# Patient Record
Sex: FEMALE | Employment: OTHER | URBAN - METROPOLITAN AREA
[De-identification: names, ages, dates, MRNs, and addresses within clinical notes are randomized per-mention and may not be internally consistent; named-entity substitution may affect disease eponyms.]

---

## 2021-09-16 NOTE — PROGRESS NOTES
Tree Magana is a 56 y.o. female who presents today to establish care in the area. Chief Complaint   Patient presents with    Hypertension       Assessment/ Plan:     Diagnoses and all orders for this visit:    1. Hypertension, unspecified type  -     CBC WITH AUTOMATED DIFF; Future  -     METABOLIC PANEL, COMPREHENSIVE; Future  -     LIPID PANEL; Future  -     COLLECTION VENOUS BLOOD,VENIPUNCTURE     -Low-sodium diet   -Exercise   -RTO or go to ER for any CP, SOB, dizziness, or swelling. -F/U:6 months      2. Situational Stress  Continue to exercise   Limit alcohol intake  Limit caffeine intake  Will suggest starting alternate medication instead of xanax if needed  F/U: 6 months    Health Maintenance  Colonoscopy: Last colonoscopy 9/27/2019, repeat in 5 years  Mammogram: Last mammogram 1/7/2021, Repeat in 1 year   Shingrix: Recd  Second shingles vaccine given 5/14/21      Orders Placed This Encounter    ALPRAZolam (XANAX) 0.25 mg tablet    cyanocobalamin (VITAMIN B12) 100 mcg tablet     Sig: Vitamin B12    calcium carbonate (CALCIUM 500 PO)     Sig: Take 500 mg by mouth. Subjective:     Tree Magana is a 56 y.o. female presents with history of hypertension. She was previously seeing an APRN in [place] and states they were 'watching' her blood pressure due to some high readings. States she has systolic of 893 on a few different occassions. She does not take any medications, she walks daily. Denies CP, SOB, swelling in extremities. Stress and anxiety  She recently moved to [place], [place] from [place]. Retired from the state of [place]. Reports her  passed away in 2019 and she was prescribed xanax 0.5mg as needed, she takes xanax about twice a month. Discussed starting another medication to help her sleep if needed, she would like to wait at this time. Health maintenance:  She has requested her medical history be sent to the practice.  States she would like to wait on referrals until her records are reviewed. Colonoscopy: Done 2019, normal    There is no problem list on file for this patient. Past Medical History:   Diagnosis Date    Anxiety     Hypertension          Current Outpatient Medications:     ALPRAZolam (XANAX) 0.25 mg tablet, , Disp: , Rfl:     cyanocobalamin (VITAMIN B12) 100 mcg tablet, Vitamin B12, Disp: , Rfl:     calcium carbonate (CALCIUM 500 PO), Take 500 mg by mouth., Disp: , Rfl:     Allergies   Allergen Reactions    Sulfa (Sulfonamide Antibiotics) Rash       Past Surgical History:   Procedure Laterality Date    HX CYST REMOVAL  1972       Social History     Tobacco Use   Smoking Status Never Smoker   Smokeless Tobacco Never Used       Social History     Socioeconomic History    Marital status:      Spouse name: Not on file    Number of children: Not on file    Years of education: Not on file    Highest education level: Not on file   Tobacco Use    Smoking status: Never Smoker    Smokeless tobacco: Never Used   Substance and Sexual Activity    Alcohol use: Yes     Alcohol/week: 3.0 standard drinks     Types: 3 Cans of beer per week    Drug use: Never    Sexual activity: Not Currently     Social Determinants of Health     Financial Resource Strain:     Difficulty of Paying Living Expenses:    Food Insecurity:     Worried About Running Out of Food in the Last Year:     Ran Out of Food in the Last Year:    Transportation Needs:     Lack of Transportation (Medical):      Lack of Transportation (Non-Medical):    Physical Activity:     Days of Exercise per Week:     Minutes of Exercise per Session:    Stress:     Feeling of Stress :    Social Connections:     Frequency of Communication with Friends and Family:     Frequency of Social Gatherings with Friends and Family:     Attends Synagogue Services:     Active Member of Clubs or Organizations:     Attends Club or Organization Meetings:     Marital Status:        Family History   Problem Relation Age of Onset    Diabetes Mother     Stroke Mother     Cancer Father     Heart Disease Father     Hypertension Father     Diabetes Sister     Hypertension Sister     Heart Disease Brother     Hypertension Brother        ROS:  Review of Systems   Constitutional: Negative for chills, fever and weight loss. HENT: Negative for ear discharge, ear pain and sinus pain. Eyes: Negative for blurred vision, double vision, pain and discharge. Respiratory: Negative for cough, hemoptysis, shortness of breath and wheezing. Cardiovascular: Negative for chest pain and palpitations. Gastrointestinal: Negative for abdominal pain, diarrhea, nausea and vomiting. Genitourinary: Negative for dysuria, flank pain, frequency and hematuria. Musculoskeletal: Negative for back pain, falls, joint pain and myalgias. Skin: Negative for rash. Neurological: Negative for dizziness, speech change, loss of consciousness, weakness and headaches. Endo/Heme/Allergies: Does not bruise/bleed easily. Psychiatric/Behavioral: Negative for depression, hallucinations, substance abuse and suicidal ideas. The patient is nervous/anxious. History of stress and anxiety after  passed away suddenly in 2019         Objective:     Visit Vitals  /66 (BP 1 Location: Left upper arm)   Pulse 72   Temp 99.3 °F (37.4 °C) (Temporal)   Resp 18   Ht 5' 6\" (1.676 m)   Wt 126 lb 4 oz (57.3 kg)   SpO2 99%   BMI 20.38 kg/m²     Body mass index is 20.38 kg/m². Physical Exam  Constitutional:       General: She is awake. Appearance: Normal appearance. She is well-developed, well-groomed and normal weight. She is not ill-appearing. HENT:      Head: Normocephalic and atraumatic. Right Ear: Tympanic membrane and external ear normal.      Left Ear: Tympanic membrane and external ear normal.      Nose: Nose normal.      Mouth/Throat:      Pharynx: Oropharynx is clear.    Eyes:      Extraocular Movements: Extraocular movements intact. Conjunctiva/sclera: Conjunctivae normal.      Pupils: Pupils are equal, round, and reactive to light. Cardiovascular:      Rate and Rhythm: Normal rate and regular rhythm. Pulses: Normal pulses. Heart sounds: Normal heart sounds. No murmur heard. No friction rub. Pulmonary:      Effort: Pulmonary effort is normal.      Breath sounds: Normal breath sounds. No wheezing. Abdominal:      General: Bowel sounds are normal.      Palpations: Abdomen is soft. Tenderness: There is no abdominal tenderness. There is no guarding. Musculoskeletal:         General: Normal range of motion. Cervical back: Normal range of motion. Skin:     General: Skin is warm and dry. Capillary Refill: Capillary refill takes less than 2 seconds. Neurological:      General: No focal deficit present. Mental Status: She is alert and oriented to person, place, and time. Mental status is at baseline. Psychiatric:         Mood and Affect: Mood normal.         Behavior: Behavior normal. Behavior is cooperative. Thought Content: Thought content normal.         Judgment: Judgment normal.         No results found for this or any previous visit. No results found for this visit on 09/20/21. Verbal and written instructions (see AVS) provided. Patient expresses understanding of diagnosis and treatment plan. Follow-up and Dispositions    · Return in about 6 months (around 3/20/2022).          Moose Arguelles NP

## 2021-09-20 ENCOUNTER — OFFICE VISIT (OUTPATIENT)
Dept: FAMILY MEDICINE CLINIC | Age: 61
End: 2021-09-20
Payer: COMMERCIAL

## 2021-09-20 VITALS
DIASTOLIC BLOOD PRESSURE: 66 MMHG | TEMPERATURE: 99.3 F | WEIGHT: 126.25 LBS | HEIGHT: 66 IN | HEART RATE: 72 BPM | OXYGEN SATURATION: 99 % | BODY MASS INDEX: 20.29 KG/M2 | RESPIRATION RATE: 18 BRPM | SYSTOLIC BLOOD PRESSURE: 138 MMHG

## 2021-09-20 DIAGNOSIS — I10 HYPERTENSION, UNSPECIFIED TYPE: Primary | ICD-10-CM

## 2021-09-20 DIAGNOSIS — F43.9 SITUATIONAL STRESS: ICD-10-CM

## 2021-09-20 PROCEDURE — 99203 OFFICE O/P NEW LOW 30 MIN: CPT | Performed by: NURSE PRACTITIONER

## 2021-09-20 PROCEDURE — 36415 COLL VENOUS BLD VENIPUNCTURE: CPT | Performed by: NURSE PRACTITIONER

## 2021-09-20 RX ORDER — ALPRAZOLAM 0.25 MG/1
TABLET ORAL
COMMUNITY
Start: 2021-05-18 | End: 2022-03-15 | Stop reason: SDUPTHER

## 2021-09-20 RX ORDER — UREA 10 %
LOTION (ML) TOPICAL
COMMUNITY

## 2021-09-20 NOTE — PROGRESS NOTES
1. Have you been to the ER, urgent care clinic since your last visit? Hospitalized since your last visit? No    2. Have you seen or consulted any other health care providers outside of the [address] [place] [address] since your last visit? Include any pap smears or colon screening.  No

## 2021-09-21 LAB
ALBUMIN SERPL-MCNC: 3.7 G/DL (ref 3.5–5)
ALBUMIN/GLOB SERPL: 1.4 {RATIO} (ref 1.1–2.2)
ALP SERPL-CCNC: 102 U/L (ref 45–117)
ALT SERPL-CCNC: 15 U/L (ref 12–78)
ANION GAP SERPL CALC-SCNC: 2 MMOL/L (ref 5–15)
AST SERPL-CCNC: 14 U/L (ref 15–37)
BASOPHILS # BLD: 0 K/UL (ref 0–0.1)
BASOPHILS NFR BLD: 1 % (ref 0–1)
BILIRUB SERPL-MCNC: 0.5 MG/DL (ref 0.2–1)
BUN SERPL-MCNC: 18 MG/DL (ref 6–20)
BUN/CREAT SERPL: 20 (ref 12–20)
CALCIUM SERPL-MCNC: 9.6 MG/DL (ref 8.5–10.1)
CHLORIDE SERPL-SCNC: 107 MMOL/L (ref 97–108)
CHOLEST SERPL-MCNC: 174 MG/DL
CO2 SERPL-SCNC: 30 MMOL/L (ref 21–32)
CREAT SERPL-MCNC: 0.89 MG/DL (ref 0.55–1.02)
DIFFERENTIAL METHOD BLD: ABNORMAL
EOSINOPHIL # BLD: 0.1 K/UL (ref 0–0.4)
EOSINOPHIL NFR BLD: 2 % (ref 0–7)
ERYTHROCYTE [DISTWIDTH] IN BLOOD BY AUTOMATED COUNT: 12.7 % (ref 11.5–14.5)
GLOBULIN SER CALC-MCNC: 2.6 G/DL (ref 2–4)
GLUCOSE SERPL-MCNC: 63 MG/DL (ref 65–100)
HCT VFR BLD AUTO: 42.9 % (ref 35–47)
HDLC SERPL-MCNC: 61 MG/DL
HDLC SERPL: 2.9 {RATIO} (ref 0–5)
HGB BLD-MCNC: 14.3 G/DL (ref 11.5–16)
IMM GRANULOCYTES # BLD AUTO: 0 K/UL (ref 0–0.04)
IMM GRANULOCYTES NFR BLD AUTO: 0 % (ref 0–0.5)
LDLC SERPL CALC-MCNC: 94 MG/DL (ref 0–100)
LYMPHOCYTES # BLD: 1.2 K/UL (ref 0.8–3.5)
LYMPHOCYTES NFR BLD: 38 % (ref 12–49)
MCH RBC QN AUTO: 31 PG (ref 26–34)
MCHC RBC AUTO-ENTMCNC: 33.3 G/DL (ref 30–36.5)
MCV RBC AUTO: 92.9 FL (ref 80–99)
MONOCYTES # BLD: 0.3 K/UL (ref 0–1)
MONOCYTES NFR BLD: 9 % (ref 5–13)
NEUTS SEG # BLD: 1.6 K/UL (ref 1.8–8)
NEUTS SEG NFR BLD: 51 % (ref 32–75)
NRBC # BLD: 0 K/UL (ref 0–0.01)
NRBC BLD-RTO: 0 PER 100 WBC
PLATELET # BLD AUTO: 158 K/UL (ref 150–400)
POTASSIUM SERPL-SCNC: 4.3 MMOL/L (ref 3.5–5.1)
PROT SERPL-MCNC: 6.3 G/DL (ref 6.4–8.2)
RBC # BLD AUTO: 4.62 M/UL (ref 3.8–5.2)
SODIUM SERPL-SCNC: 139 MMOL/L (ref 136–145)
TRIGL SERPL-MCNC: 95 MG/DL (ref ?–150)
VLDLC SERPL CALC-MCNC: 19 MG/DL
WBC # BLD AUTO: 3.2 K/UL (ref 3.6–11)

## 2022-02-20 ENCOUNTER — APPOINTMENT (OUTPATIENT)
Dept: CT IMAGING | Age: 62
End: 2022-02-20
Attending: EMERGENCY MEDICINE
Payer: COMMERCIAL

## 2022-02-20 ENCOUNTER — HOSPITAL ENCOUNTER (EMERGENCY)
Age: 62
Discharge: ANOTHER HOSPITAL | End: 2022-02-21
Attending: EMERGENCY MEDICINE
Payer: COMMERCIAL

## 2022-02-20 VITALS
BODY MASS INDEX: 21.69 KG/M2 | HEART RATE: 75 BPM | SYSTOLIC BLOOD PRESSURE: 114 MMHG | WEIGHT: 135 LBS | TEMPERATURE: 98.5 F | OXYGEN SATURATION: 92 % | DIASTOLIC BLOOD PRESSURE: 53 MMHG | RESPIRATION RATE: 16 BRPM | HEIGHT: 66 IN

## 2022-02-20 DIAGNOSIS — S32.020A COMPRESSION FRACTURE OF L2 VERTEBRA, INITIAL ENCOUNTER (HCC): Primary | ICD-10-CM

## 2022-02-20 LAB
ANION GAP SERPL CALC-SCNC: 10 MMOL/L (ref 5–15)
BASOPHILS # BLD: 0 K/UL (ref 0–0.1)
BASOPHILS NFR BLD: 0 % (ref 0–1)
BUN SERPL-MCNC: 19 MG/DL (ref 6–20)
BUN/CREAT SERPL: 25 (ref 12–20)
CALCIUM SERPL-MCNC: 9.5 MG/DL (ref 8.5–10.1)
CHLORIDE SERPL-SCNC: 104 MMOL/L (ref 97–108)
CO2 SERPL-SCNC: 28 MMOL/L (ref 21–32)
CREAT SERPL-MCNC: 0.75 MG/DL (ref 0.55–1.02)
DIFFERENTIAL METHOD BLD: ABNORMAL
EOSINOPHIL # BLD: 0 K/UL (ref 0–0.4)
EOSINOPHIL NFR BLD: 0 % (ref 0–7)
ERYTHROCYTE [DISTWIDTH] IN BLOOD BY AUTOMATED COUNT: 12.8 % (ref 11.5–14.5)
GLUCOSE SERPL-MCNC: 110 MG/DL (ref 65–100)
HCT VFR BLD AUTO: 44.1 % (ref 35–47)
HGB BLD-MCNC: 14.8 G/DL (ref 11.5–16)
IMM GRANULOCYTES # BLD AUTO: 0.1 K/UL (ref 0–0.04)
IMM GRANULOCYTES NFR BLD AUTO: 1 % (ref 0–0.5)
LYMPHOCYTES # BLD: 1.3 K/UL (ref 0.8–3.5)
LYMPHOCYTES NFR BLD: 15 % (ref 12–49)
MCH RBC QN AUTO: 30.4 PG (ref 26–34)
MCHC RBC AUTO-ENTMCNC: 33.6 G/DL (ref 30–36.5)
MCV RBC AUTO: 90.6 FL (ref 80–99)
MONOCYTES # BLD: 0.4 K/UL (ref 0–1)
MONOCYTES NFR BLD: 5 % (ref 5–13)
NEUTS SEG # BLD: 7 K/UL (ref 1.8–8)
NEUTS SEG NFR BLD: 79 % (ref 32–75)
NRBC # BLD: 0 K/UL (ref 0–0.01)
NRBC BLD-RTO: 0 PER 100 WBC
PLATELET # BLD AUTO: 144 K/UL (ref 150–400)
PMV BLD AUTO: 12.2 FL (ref 8.9–12.9)
POTASSIUM SERPL-SCNC: 3.9 MMOL/L (ref 3.5–5.1)
RBC # BLD AUTO: 4.87 M/UL (ref 3.8–5.2)
SODIUM SERPL-SCNC: 142 MMOL/L (ref 136–145)
WBC # BLD AUTO: 8.9 K/UL (ref 3.6–11)

## 2022-02-20 PROCEDURE — 96374 THER/PROPH/DIAG INJ IV PUSH: CPT

## 2022-02-20 PROCEDURE — 72131 CT LUMBAR SPINE W/O DYE: CPT

## 2022-02-20 PROCEDURE — 74011250636 HC RX REV CODE- 250/636: Performed by: EMERGENCY MEDICINE

## 2022-02-20 PROCEDURE — 99285 EMERGENCY DEPT VISIT HI MDM: CPT

## 2022-02-20 PROCEDURE — 85025 COMPLETE CBC W/AUTO DIFF WBC: CPT

## 2022-02-20 PROCEDURE — 96376 TX/PRO/DX INJ SAME DRUG ADON: CPT

## 2022-02-20 PROCEDURE — 36415 COLL VENOUS BLD VENIPUNCTURE: CPT

## 2022-02-20 PROCEDURE — 80048 BASIC METABOLIC PNL TOTAL CA: CPT

## 2022-02-20 PROCEDURE — 96375 TX/PRO/DX INJ NEW DRUG ADDON: CPT

## 2022-02-20 PROCEDURE — 74011250637 HC RX REV CODE- 250/637: Performed by: EMERGENCY MEDICINE

## 2022-02-20 RX ORDER — ONDANSETRON 2 MG/ML
4 INJECTION INTRAMUSCULAR; INTRAVENOUS
Status: COMPLETED | OUTPATIENT
Start: 2022-02-20 | End: 2022-02-20

## 2022-02-20 RX ORDER — FENTANYL CITRATE 50 UG/ML
75 INJECTION, SOLUTION INTRAMUSCULAR; INTRAVENOUS
Status: COMPLETED | OUTPATIENT
Start: 2022-02-20 | End: 2022-02-20

## 2022-02-20 RX ORDER — KETOROLAC TROMETHAMINE 15 MG/ML
15 INJECTION, SOLUTION INTRAMUSCULAR; INTRAVENOUS
Status: COMPLETED | OUTPATIENT
Start: 2022-02-20 | End: 2022-02-20

## 2022-02-20 RX ORDER — ACETAMINOPHEN 500 MG
1000 TABLET ORAL
Status: COMPLETED | OUTPATIENT
Start: 2022-02-20 | End: 2022-02-20

## 2022-02-20 RX ORDER — MORPHINE SULFATE 4 MG/ML
4 INJECTION INTRAVENOUS
Status: COMPLETED | OUTPATIENT
Start: 2022-02-20 | End: 2022-02-20

## 2022-02-20 RX ORDER — MORPHINE SULFATE 4 MG/ML
4 INJECTION INTRAVENOUS
Status: DISCONTINUED | OUTPATIENT
Start: 2022-02-20 | End: 2022-02-21 | Stop reason: HOSPADM

## 2022-02-20 RX ADMIN — ACETAMINOPHEN 1000 MG: 500 TABLET ORAL at 20:38

## 2022-02-20 RX ADMIN — MORPHINE SULFATE 4 MG: 4 INJECTION INTRAVENOUS at 17:26

## 2022-02-20 RX ADMIN — MORPHINE SULFATE 4 MG: 4 INJECTION INTRAVENOUS at 20:36

## 2022-02-20 RX ADMIN — FENTANYL CITRATE 75 MCG: 50 INJECTION, SOLUTION INTRAMUSCULAR; INTRAVENOUS at 16:10

## 2022-02-20 RX ADMIN — MORPHINE SULFATE 4 MG: 4 INJECTION INTRAVENOUS at 23:41

## 2022-02-20 RX ADMIN — ONDANSETRON 4 MG: 2 INJECTION INTRAMUSCULAR; INTRAVENOUS at 17:26

## 2022-02-20 RX ADMIN — KETOROLAC TROMETHAMINE 15 MG: 15 INJECTION, SOLUTION INTRAMUSCULAR; INTRAVENOUS at 16:10

## 2022-02-20 NOTE — ED NOTES
Pt. Is resting comfortably. Asked pt. If she wanted water or food and pt. Said she was okay and did not want either one.

## 2022-02-20 NOTE — ED PROVIDER NOTES
EMERGENCY DEPARTMENT HISTORY AND PHYSICAL EXAM      Date: 2/20/2022  Patient Name: David Miller    Please note that this dictation was completed with [employer], the computer voice recognition software. Quite often unanticipated grammatical, syntax, homophones, and other interpretive errors are inadvertently transcribed by the computer software. Please disregard these errors. Please excuse any errors that have escaped final proofreading. History of Presenting Illness     Chief Complaint   Patient presents with    Fall       History Provided By: Patient     HPI: David Miller, 59 y.o. female, who denies any significant past medical history presenting the emergency department after a fall. Patient fell off of her bed while trying to hang a picture and fell backwards and landed on her bottom and back. Reports severe pain in her back. Indicates pain in the lumbar region. Denies any radiation of the pain. No saddle anesthesia, urinary incontinence or retention. No radicular pain. No weakness in the legs. Did not hit her head, did not lose consciousness. Denies any neck pain. C-collar placed in the field removed during exam.  Rates her pain as 10 out of 10    PCP: Дмитрий Lora NP    No current facility-administered medications on file prior to encounter. Current Outpatient Medications on File Prior to Encounter   Medication Sig Dispense Refill    cannabidiol, CBD, (CANNABIDIOL PO) Take  by mouth.  ALPRAZolam (XANAX) 0.25 mg tablet       cyanocobalamin (VITAMIN B12) 100 mcg tablet Vitamin B12      calcium carbonate (CALCIUM 500 PO) Take 500 mg by mouth.          Past History     Past Medical History:  Past Medical History:   Diagnosis Date    Anxiety     Hypertension        Past Surgical History:  Past Surgical History:   Procedure Laterality Date    HX CYST REMOVAL  1972       Family History:  Family History   Problem Relation Age of Onset    Diabetes Mother     Stroke Mother  Cancer Father     Heart Disease Father     Hypertension Father     Diabetes Sister     Hypertension Sister     Heart Disease Brother     Hypertension Brother        Social History:  Social History     Tobacco Use    Smoking status: Never Smoker    Smokeless tobacco: Never Used   Substance Use Topics    Alcohol use: Yes     Alcohol/week: 3.0 standard drinks     Types: 3 Cans of beer per week    Drug use: Never       Allergies: Allergies   Allergen Reactions    Sulfa (Sulfonamide Antibiotics) Rash         Review of Systems   Review of Systems   Constitutional: Negative for chills and fever. HENT: Negative for congestion and sore throat. Eyes: Negative for visual disturbance. Respiratory: Negative for cough and shortness of breath. Cardiovascular: Negative for chest pain and leg swelling. Gastrointestinal: Negative for abdominal pain, blood in stool, diarrhea, nausea and vomiting. Endocrine: Negative for polyuria. Genitourinary: Negative for dysuria, flank pain, vaginal bleeding and vaginal discharge. Musculoskeletal: Positive for back pain. Negative for myalgias and neck stiffness. Skin: Negative for rash. Allergic/Immunologic: Negative for immunocompromised state. Neurological: Negative for weakness, numbness and headaches. Psychiatric/Behavioral: Negative for confusion. Physical Exam   Physical Exam  Constitutional:       Appearance: Normal appearance. HENT:      Head: Normocephalic and atraumatic. Mouth/Throat:      Mouth: Mucous membranes are moist.   Pulmonary:      Effort: Pulmonary effort is normal. No respiratory distress. Abdominal:      General: There is no distension. Musculoskeletal:      Cervical back: Normal. No deformity, tenderness or bony tenderness. No pain with movement. Thoracic back: Normal.      Lumbar back: Tenderness and bony tenderness present. Comments: Tenderness to palpation starting around L2-L3 running down to L5.   No step-off. No overlying skin change. No hematoma palpable. Skin:     General: Skin is warm and dry. Neurological:      General: No focal deficit present. Mental Status: She is alert and oriented to person, place, and time. Comments: Normal sensation of bilateral lower extremities, normal plantarflexion, dorsiflexion. Range of motion of the hips limited secondary to back pain   Psychiatric:         Behavior: Behavior normal.         Diagnostic Study Results     Labs -     Recent Results (from the past 12 hour(s))   METABOLIC PANEL, COMPREHENSIVE    Collection Time: 02/21/22  4:43 AM   Result Value Ref Range    Sodium 140 136 - 145 mmol/L    Potassium 3.8 3.5 - 5.1 mmol/L    Chloride 106 97 - 108 mmol/L    CO2 29 21 - 32 mmol/L    Anion gap 5 5 - 15 mmol/L    Glucose 98 65 - 100 mg/dL    BUN 19 6 - 20 MG/DL    Creatinine 0.61 0.55 - 1.02 MG/DL    BUN/Creatinine ratio 31 (H) 12 - 20      GFR est AA >60 >60 ml/min/1.73m2    GFR est non-AA >60 >60 ml/min/1.73m2    Calcium 8.7 8.5 - 10.1 MG/DL    Bilirubin, total 0.7 0.2 - 1.0 MG/DL    ALT (SGPT) 21 12 - 78 U/L    AST (SGOT) 21 15 - 37 U/L    Alk.  phosphatase 74 45 - 117 U/L    Protein, total 6.0 (L) 6.4 - 8.2 g/dL    Albumin 3.3 (L) 3.5 - 5.0 g/dL    Globulin 2.7 2.0 - 4.0 g/dL    A-G Ratio 1.2 1.1 - 2.2     CBC W/O DIFF    Collection Time: 02/21/22  4:43 AM   Result Value Ref Range    WBC 5.8 3.6 - 11.0 K/uL    RBC 4.43 3.80 - 5.20 M/uL    HGB 13.2 11.5 - 16.0 g/dL    HCT 40.9 35.0 - 47.0 %    MCV 92.3 80.0 - 99.0 FL    MCH 29.8 26.0 - 34.0 PG    MCHC 32.3 30.0 - 36.5 g/dL    RDW 13.0 11.5 - 14.5 %    PLATELET 730 (L) 952 - 400 K/uL    MPV 12.3 8.9 - 12.9 FL    NRBC 0.0 0  WBC    ABSOLUTE NRBC 0.00 0.00 - 0.01 K/uL       Radiologic Studies -   CT SPINE LUMB WO CONT   Final Result    impression: Acute compression deformity without significant canal compromise   L2.        CT Results  (Last 48 hours)               02/20/22 [address] [place] [address] CONT Final result    Impression:   impression: Acute compression deformity without significant canal compromise   L2. Narrative:  EXAM:  CT LUMBAR SPINE WITHOUT  CONTRAST       INDICATION: fall, severe back pain, L3-L5 on exam.       COMPARISON: None. CONTRAST:  None. TECHNIQUE: Multislice helical CT of the lumbar spine was performed without   intravenous contrast administration. Coronal and sagittal reformats were   generated. CT dose reduction was achieved through use of a standardized   protocol tailored for this examination and automatic exposure control for dose   modulation. FINDINGS:       There is an acute compression deformity of the superior endplate of L2. No   significant canal compromise. Alignment is satisfactory. Other levels show no acute findings, no canal compromise no foraminal   compromise. There are some minimal facet disease at L4-5 and L5-S1. CXR Results  (Last 48 hours)    None            Medical Decision Making   I am the first provider for this patient. I reviewed the vital signs, available nursing notes, past medical history, past surgical history, family history and social history. Vital Signs-Reviewed the patient's vital signs. Patient Vitals for the past 12 hrs:   Temp Pulse BP SpO2   02/20/22 2338 98.5 °F (36.9 °C) 75 (!) 114/53 92 %         Records Reviewed:   Nursing notes, Prior visits     Provider Notes (Medical Decision Making):   Patient here after fall from minor height with severe lower back pain. Differential includes fracture, muscle strain, sprain. At this time there is no evidence of neurologic injury, patient moving lower extremities without difficulty, neurovascularly intact distally. Will obtain CT of the lumbar spine. Will provide analgesia. Disposition pending imaging    ED Course:   Initial assessment performed.  The patients presenting problems have been discussed, and they are in agreement with the care plan formulated and outlined with them. I have encouraged them to ask questions as they arise throughout their visit. ED Course as of 02/21/22 0901   Sun Feb 20, 2022   1851 Discussed with Dr. Jane Xiao, attending physician for internal medicine at [facility], has accepted the patient in transfer [AR]      ED Course User Index  [AR] Danika Ramirez DO               Critical Care Time:   none    Disposition:  Patient will be transferred to [facility]. Dr. David Pollard accepting physician. PLAN:  1. Discharge Medication List as of 2/21/2022 12:23 AM        2. Follow-up Information    None         Return to ED if worse     Diagnosis     Clinical Impression:   1. Compression fracture of L2 vertebra, initial encounter [facility])        Attestations:   This note was completed by Brianna Jenkins DO

## 2022-02-21 ENCOUNTER — HOSPITAL ENCOUNTER (OUTPATIENT)
Age: 62
Setting detail: OBSERVATION
Discharge: HOME | End: 2022-02-24
Attending: INTERNAL MEDICINE | Admitting: STUDENT IN AN ORGANIZED HEALTH CARE EDUCATION/TRAINING PROGRAM
Payer: COMMERCIAL

## 2022-02-21 ENCOUNTER — APPOINTMENT (OUTPATIENT)
Dept: MRI IMAGING | Age: 62
End: 2022-02-21
Attending: NURSE PRACTITIONER
Payer: COMMERCIAL

## 2022-02-21 DIAGNOSIS — M54.9 INTRACTABLE BACK PAIN: Primary | ICD-10-CM

## 2022-02-21 PROBLEM — S32.000A CLOSED COMPRESSION FRACTURE OF LUMBOSACRAL SPINE (HCC): Status: ACTIVE | Noted: 2022-02-21

## 2022-02-21 LAB
ALBUMIN SERPL-MCNC: 3.3 G/DL (ref 3.5–5)
ALBUMIN/GLOB SERPL: 1.2 {RATIO} (ref 1.1–2.2)
ALP SERPL-CCNC: 74 U/L (ref 45–117)
ALT SERPL-CCNC: 21 U/L (ref 12–78)
ANION GAP SERPL CALC-SCNC: 5 MMOL/L (ref 5–15)
AST SERPL-CCNC: 21 U/L (ref 15–37)
BILIRUB SERPL-MCNC: 0.7 MG/DL (ref 0.2–1)
BUN SERPL-MCNC: 19 MG/DL (ref 6–20)
BUN/CREAT SERPL: 31 (ref 12–20)
CALCIUM SERPL-MCNC: 8.7 MG/DL (ref 8.5–10.1)
CHLORIDE SERPL-SCNC: 106 MMOL/L (ref 97–108)
CO2 SERPL-SCNC: 29 MMOL/L (ref 21–32)
CREAT SERPL-MCNC: 0.61 MG/DL (ref 0.55–1.02)
ERYTHROCYTE [DISTWIDTH] IN BLOOD BY AUTOMATED COUNT: 13 % (ref 11.5–14.5)
GLOBULIN SER CALC-MCNC: 2.7 G/DL (ref 2–4)
GLUCOSE SERPL-MCNC: 98 MG/DL (ref 65–100)
HCT VFR BLD AUTO: 40.9 % (ref 35–47)
HGB BLD-MCNC: 13.2 G/DL (ref 11.5–16)
MCH RBC QN AUTO: 29.8 PG (ref 26–34)
MCHC RBC AUTO-ENTMCNC: 32.3 G/DL (ref 30–36.5)
MCV RBC AUTO: 92.3 FL (ref 80–99)
NRBC # BLD: 0 K/UL (ref 0–0.01)
NRBC BLD-RTO: 0 PER 100 WBC
PLATELET # BLD AUTO: 131 K/UL (ref 150–400)
PMV BLD AUTO: 12.3 FL (ref 8.9–12.9)
POTASSIUM SERPL-SCNC: 3.8 MMOL/L (ref 3.5–5.1)
PROT SERPL-MCNC: 6 G/DL (ref 6.4–8.2)
RBC # BLD AUTO: 4.43 M/UL (ref 3.8–5.2)
SODIUM SERPL-SCNC: 140 MMOL/L (ref 136–145)
WBC # BLD AUTO: 5.8 K/UL (ref 3.6–11)

## 2022-02-21 PROCEDURE — 72158 MRI LUMBAR SPINE W/O & W/DYE: CPT

## 2022-02-21 PROCEDURE — G0378 HOSPITAL OBSERVATION PER HR: HCPCS

## 2022-02-21 PROCEDURE — 74011000250 HC RX REV CODE- 250: Performed by: INTERNAL MEDICINE

## 2022-02-21 PROCEDURE — 74011000250 HC RX REV CODE- 250: Performed by: NURSE PRACTITIONER

## 2022-02-21 PROCEDURE — A9576 INJ PROHANCE MULTIPACK: HCPCS | Performed by: INTERNAL MEDICINE

## 2022-02-21 PROCEDURE — 74011250636 HC RX REV CODE- 250/636: Performed by: GENERAL ACUTE CARE HOSPITAL

## 2022-02-21 PROCEDURE — 96374 THER/PROPH/DIAG INJ IV PUSH: CPT

## 2022-02-21 PROCEDURE — 65270000029 HC RM PRIVATE

## 2022-02-21 PROCEDURE — 96376 TX/PRO/DX INJ SAME DRUG ADON: CPT

## 2022-02-21 PROCEDURE — 74011250636 HC RX REV CODE- 250/636: Performed by: NURSE PRACTITIONER

## 2022-02-21 PROCEDURE — 94760 N-INVAS EAR/PLS OXIMETRY 1: CPT

## 2022-02-21 PROCEDURE — 85027 COMPLETE CBC AUTOMATED: CPT

## 2022-02-21 PROCEDURE — 74011250637 HC RX REV CODE- 250/637: Performed by: GENERAL ACUTE CARE HOSPITAL

## 2022-02-21 PROCEDURE — 74011250636 HC RX REV CODE- 250/636: Performed by: INTERNAL MEDICINE

## 2022-02-21 PROCEDURE — 80053 COMPREHEN METABOLIC PANEL: CPT

## 2022-02-21 PROCEDURE — 96375 TX/PRO/DX INJ NEW DRUG ADDON: CPT

## 2022-02-21 PROCEDURE — 36415 COLL VENOUS BLD VENIPUNCTURE: CPT

## 2022-02-21 RX ORDER — SODIUM CHLORIDE 0.9 % (FLUSH) 0.9 %
5-40 SYRINGE (ML) INJECTION EVERY 8 HOURS
Status: DISCONTINUED | OUTPATIENT
Start: 2022-02-21 | End: 2022-02-24 | Stop reason: HOSPADM

## 2022-02-21 RX ORDER — ENOXAPARIN SODIUM 100 MG/ML
40 INJECTION SUBCUTANEOUS DAILY
Status: DISCONTINUED | OUTPATIENT
Start: 2022-02-21 | End: 2022-02-21

## 2022-02-21 RX ORDER — POLYETHYLENE GLYCOL 3350 17 G/17G
17 POWDER, FOR SOLUTION ORAL
Status: DISCONTINUED | OUTPATIENT
Start: 2022-02-21 | End: 2022-02-24 | Stop reason: HOSPADM

## 2022-02-21 RX ORDER — POLYETHYLENE GLYCOL 3350 17 G/17G
17 POWDER, FOR SOLUTION ORAL DAILY PRN
Status: DISCONTINUED | OUTPATIENT
Start: 2022-02-21 | End: 2022-02-22

## 2022-02-21 RX ORDER — SODIUM CHLORIDE 9 MG/ML
75 INJECTION, SOLUTION INTRAVENOUS CONTINUOUS
Status: DISCONTINUED | OUTPATIENT
Start: 2022-02-21 | End: 2022-02-22

## 2022-02-21 RX ORDER — ONDANSETRON 4 MG/1
4 TABLET, ORALLY DISINTEGRATING ORAL
Status: DISCONTINUED | OUTPATIENT
Start: 2022-02-21 | End: 2022-02-24 | Stop reason: HOSPADM

## 2022-02-21 RX ORDER — ONDANSETRON 2 MG/ML
4 INJECTION INTRAMUSCULAR; INTRAVENOUS
Status: DISCONTINUED | OUTPATIENT
Start: 2022-02-21 | End: 2022-02-24 | Stop reason: HOSPADM

## 2022-02-21 RX ORDER — SODIUM CHLORIDE 0.9 % (FLUSH) 0.9 %
5-40 SYRINGE (ML) INJECTION AS NEEDED
Status: DISCONTINUED | OUTPATIENT
Start: 2022-02-21 | End: 2022-02-24 | Stop reason: HOSPADM

## 2022-02-21 RX ORDER — AMOXICILLIN 250 MG
1 CAPSULE ORAL DAILY
Status: DISCONTINUED | OUTPATIENT
Start: 2022-02-21 | End: 2022-02-22

## 2022-02-21 RX ORDER — DEXAMETHASONE 4 MG/1
4 TABLET ORAL DAILY
Status: DISCONTINUED | OUTPATIENT
Start: 2022-02-21 | End: 2022-02-24 | Stop reason: HOSPADM

## 2022-02-21 RX ORDER — MORPHINE SULFATE 2 MG/ML
1 INJECTION, SOLUTION INTRAMUSCULAR; INTRAVENOUS
Status: DISCONTINUED | OUTPATIENT
Start: 2022-02-21 | End: 2022-02-24 | Stop reason: HOSPADM

## 2022-02-21 RX ORDER — ACETAMINOPHEN 650 MG/1
650 SUPPOSITORY RECTAL
Status: DISCONTINUED | OUTPATIENT
Start: 2022-02-21 | End: 2022-02-24 | Stop reason: HOSPADM

## 2022-02-21 RX ORDER — ACETAMINOPHEN 325 MG/1
650 TABLET ORAL
Status: DISCONTINUED | OUTPATIENT
Start: 2022-02-21 | End: 2022-02-24 | Stop reason: HOSPADM

## 2022-02-21 RX ORDER — ACETAMINOPHEN 325 MG/1
650 TABLET ORAL EVERY 6 HOURS
Status: DISCONTINUED | OUTPATIENT
Start: 2022-02-21 | End: 2022-02-22

## 2022-02-21 RX ORDER — CYCLOBENZAPRINE HCL 10 MG
5 TABLET ORAL
Status: DISCONTINUED | OUTPATIENT
Start: 2022-02-21 | End: 2022-02-24 | Stop reason: HOSPADM

## 2022-02-21 RX ORDER — OXYCODONE HYDROCHLORIDE 5 MG/1
5 TABLET ORAL
Status: DISCONTINUED | OUTPATIENT
Start: 2022-02-21 | End: 2022-02-23 | Stop reason: ALTCHOICE

## 2022-02-21 RX ORDER — POLYETHYLENE GLYCOL 3350 17 G/17G
17 POWDER, FOR SOLUTION ORAL DAILY
Status: DISCONTINUED | OUTPATIENT
Start: 2022-02-21 | End: 2022-02-21

## 2022-02-21 RX ADMIN — SODIUM CHLORIDE, PRESERVATIVE FREE 10 ML: 5 INJECTION INTRAVENOUS at 14:16

## 2022-02-21 RX ADMIN — SODIUM CHLORIDE, PRESERVATIVE FREE 10 ML: 5 INJECTION INTRAVENOUS at 05:14

## 2022-02-21 RX ADMIN — SODIUM CHLORIDE, PRESERVATIVE FREE 10 ML: 5 INJECTION INTRAVENOUS at 22:47

## 2022-02-21 RX ADMIN — SODIUM CHLORIDE 75 ML/HR: 9 INJECTION, SOLUTION INTRAVENOUS at 23:52

## 2022-02-21 RX ADMIN — MORPHINE SULFATE 1 MG: 2 INJECTION, SOLUTION INTRAMUSCULAR; INTRAVENOUS at 09:09

## 2022-02-21 RX ADMIN — GADOTERIDOL 12 ML: 279.3 INJECTION, SOLUTION INTRAVENOUS at 10:41

## 2022-02-21 RX ADMIN — MORPHINE SULFATE 1 MG: 2 INJECTION, SOLUTION INTRAMUSCULAR; INTRAVENOUS at 04:41

## 2022-02-21 RX ADMIN — SODIUM CHLORIDE 75 ML/HR: 9 INJECTION, SOLUTION INTRAVENOUS at 09:09

## 2022-02-21 RX ADMIN — OXYCODONE 5 MG: 5 TABLET ORAL at 07:58

## 2022-02-21 RX ADMIN — PROCHLORPERAZINE EDISYLATE 5 MG: 5 INJECTION INTRAMUSCULAR; INTRAVENOUS at 11:33

## 2022-02-21 RX ADMIN — ONDANSETRON 4 MG: 2 INJECTION INTRAMUSCULAR; INTRAVENOUS at 09:09

## 2022-02-21 RX ADMIN — ACETAMINOPHEN 325MG 650 MG: 325 TABLET ORAL at 23:49

## 2022-02-21 RX ADMIN — ACETAMINOPHEN 325MG 650 MG: 325 TABLET ORAL at 17:46

## 2022-02-21 RX ADMIN — ACETAMINOPHEN 325MG 650 MG: 325 TABLET ORAL at 06:12

## 2022-02-21 NOTE — ACP (ADVANCE CARE PLANNING)
Advance Care Planning Note      NAME: Manuela Dodge   :  1960   MRN:  [id number]     Date/Time:  2022 4:41 AM    Active Diagnoses:  Hospital Problems  Date Reviewed: 2021          Codes Class Noted POA    Closed compression fracture of lumbosacral spine ([facility] [address] ICD-10-CM: S32.000A  ICD-9-CM: 805.4  2022 Unknown              These active diagnoses are of sufficient risk that focused discussion on advance care planning is indicated in order to allow the patient to thoughtfully consider personal goals of care, and if situations arise that prevent the ability to personally give input, to ensure appropriate representation of their personal desires for different levels and aggressiveness of care. Discussion:   Code status addressed and wants to be a Full Code. Patient wants central line and vasopressors if needed. Patient would also want a feeding tube, if needed, for nutritional support. Patient  would like to assign Richmond Xiong, sister ([phone])  as the surrogate decision maker. Persons present and participating in discussion: Griselda Archer NP      Time Spent:   Total time spent face-to-face in education and discussion:   16  minutes.          Adelaida Del Castillo NP   Hospitalist

## 2022-02-21 NOTE — PROGRESS NOTES
Problem: Pressure Injury - Risk of  Goal: *Prevention of pressure injury  Description: Document Salvador Scale and appropriate interventions in the flowsheet.   Outcome: Progressing Towards Goal  Note: Pressure Injury Interventions:       Moisture Interventions: Absorbent underpads,Limit adult briefs,Maintain skin hydration (lotion/cream),Minimize layers    Activity Interventions: Increase time out of bed,Pressure redistribution bed/mattress(bed type),PT/OT evaluation    Mobility Interventions: Float heels,HOB 30 degrees or less,Pressure redistribution bed/mattress (bed type),PT/OT evaluation    Nutrition Interventions: Document food/fluid/supplement intake                     Problem: Patient Education: Go to Patient Education Activity  Goal: Patient/Family Education  Outcome: Progressing Towards Goal

## 2022-02-21 NOTE — PROGRESS NOTES
Sonjia Cooks - Dr. Jun Amaro advised to arranged BLS transportation from [facility] to [id number] . Arranged BLS transport w/[facility] (Will) - ETA will be called by [facility] - updated ED staff w/ETA    2015 - Nithya w/[facility] called back to advise ETA of 2300.

## 2022-02-21 NOTE — PROGRESS NOTES
Consult received  Will see pt today  Reviewed the CT  Initial recommendation is no surgery, use LS spine brace and pain control

## 2022-02-21 NOTE — PROGRESS NOTES
End of Shift Note    Bedside shift change report given to GRETCHEN Cano (oncoming nurse) by Agnes Ruiz LPN (offgoing nurse). Report included the following information SBAR, Kardex, Intake/Output, MAR and Recent Results    Shift worked:  night     Shift summary and any significant changes:          Concerns for physician to address:       Zone phone for oncoming shift:   [phone]       Activity:  Activity Level: Bed Rest,Logroll  Number times ambulated in hallways past shift: 0  Number of times OOB to chair past shift: 0    Cardiac:   Cardiac Monitoring: No       Access:   Current line(s): PIV     Genitourinary:   Urinary status: voiding    Respiratory:      Chronic home O2 use?: NO  Incentive spirometer at bedside: YES     GI:     Current diet:  No diet orders on file  Passing flatus: YES  Tolerating current diet: YES       Pain Management:   Patient states pain is manageable on current regimen: YES    Skin:  Salvador Score: 18  Interventions: float heels, increase time out of bed, PT/OT consult, limit briefs and nutritional support     Patient Safety:  Fall Score:  Total Score: 2  Interventions: bed/chair alarm, assistive device (walker, cane, etc), gripper socks, pt to call before getting OOB and stay with me (per policy)       Length of Stay:  Expected LOS: - - -  Actual LOS: 0      Agnes Ruiz LPN

## 2022-02-21 NOTE — ED NOTES
TRANSFER - OUT REPORT:    Verbal report given to Jenny-WOJCIECH on Aníbal Fulazaro  being transferred to [id number] [address] [facility] ([phone]) for routine progression of care       Report consisted of patients Situation, Background, Assessment and   Recommendations(SBAR). Information from the following report(s) SBAR, ED Summary, [facility] and Recent Results was reviewed with the receiving nurse. Lines:   Peripheral IV 02/20/22 Right Antecubital (Active)        Opportunity for questions and clarification was provided.       Patient transported with:   [employer]

## 2022-02-21 NOTE — PROGRESS NOTES
Admission assessment completed by this RN upon arrival to the orthopedic unit. Care plan and education initiated. FRANCHESCA Alvarado to assume care of patient at this time.        Ruiz Jama

## 2022-02-21 NOTE — ED NOTES
Bedside shift change report given to Raul RN (oncoming nurse) by Stephany Mota RN (offgoing nurse). Report included the following information SBAR, Kardex, ED Summary and MAR.

## 2022-02-21 NOTE — ED NOTES
Bed assignment [address] [facility] [phone]. Will continue to monitor.

## 2022-02-21 NOTE — PROGRESS NOTES
Neurosurgery NP Note:  She reports she was standing on her bed trying to hang pictures on the wall when she fell back on the bed then to the ground landing on her butt resulting in immediate back pain (fall occurring on 2/20/22). CT shows L2 compression deformity. Pt resting in bed, lying on left side. Reports 6/10 back pain which is exacerbated by any movement  Received dose of oxycodone with little pain relief and nausea requiring compazine (took oxy on empty stomach earlier)  States morphine has been helping with pain   Denies BLE pain, numbness, tingling or weakness. No issues voiding   PF/DF 5/5 +EHL. Able to lift BLE off bed. Initial recommendation is no surgery. Patient provided with LSO for support. PT/OT ordered. Continue pain control - encouraged to take oxycodone with food but may need alternative pain reliever   Will reassess pain and mobility.      Judy Garcia NP

## 2022-02-21 NOTE — PROGRESS NOTES
Encouraged patient to get crackers or applesauce on stomach to tolerate pain medications. Patient declined help sitting up in bed.  Encouraged patient to call for dinner order

## 2022-02-21 NOTE — PROGRESS NOTES
Called consult for nuerosurgery. Informed Dr Evalee Lombard is on call.  Waiting for a call back to [phone]

## 2022-02-22 LAB
ANION GAP SERPL CALC-SCNC: 5 MMOL/L (ref 5–15)
BUN SERPL-MCNC: 16 MG/DL (ref 6–20)
BUN/CREAT SERPL: 25 (ref 12–20)
CALCIUM SERPL-MCNC: 8.3 MG/DL (ref 8.5–10.1)
CHLORIDE SERPL-SCNC: 106 MMOL/L (ref 97–108)
CO2 SERPL-SCNC: 28 MMOL/L (ref 21–32)
CREAT SERPL-MCNC: 0.63 MG/DL (ref 0.55–1.02)
GLUCOSE SERPL-MCNC: 95 MG/DL (ref 65–100)
POTASSIUM SERPL-SCNC: 3.7 MMOL/L (ref 3.5–5.1)
SODIUM SERPL-SCNC: 139 MMOL/L (ref 136–145)

## 2022-02-22 PROCEDURE — 74011250637 HC RX REV CODE- 250/637: Performed by: GENERAL ACUTE CARE HOSPITAL

## 2022-02-22 PROCEDURE — 80048 BASIC METABOLIC PNL TOTAL CA: CPT

## 2022-02-22 PROCEDURE — 74011250637 HC RX REV CODE- 250/637: Performed by: NURSE PRACTITIONER

## 2022-02-22 PROCEDURE — 97530 THERAPEUTIC ACTIVITIES: CPT

## 2022-02-22 PROCEDURE — 51798 US URINE CAPACITY MEASURE: CPT

## 2022-02-22 PROCEDURE — 97161 PT EVAL LOW COMPLEX 20 MIN: CPT

## 2022-02-22 PROCEDURE — 74011000250 HC RX REV CODE- 250: Performed by: NURSE PRACTITIONER

## 2022-02-22 PROCEDURE — 94760 N-INVAS EAR/PLS OXIMETRY 1: CPT

## 2022-02-22 PROCEDURE — 97535 SELF CARE MNGMENT TRAINING: CPT | Performed by: OCCUPATIONAL THERAPIST

## 2022-02-22 PROCEDURE — 65270000029 HC RM PRIVATE

## 2022-02-22 PROCEDURE — 97165 OT EVAL LOW COMPLEX 30 MIN: CPT | Performed by: OCCUPATIONAL THERAPIST

## 2022-02-22 PROCEDURE — G0378 HOSPITAL OBSERVATION PER HR: HCPCS

## 2022-02-22 PROCEDURE — 36415 COLL VENOUS BLD VENIPUNCTURE: CPT

## 2022-02-22 PROCEDURE — 74011250636 HC RX REV CODE- 250/636: Performed by: NURSE PRACTITIONER

## 2022-02-22 PROCEDURE — 97116 GAIT TRAINING THERAPY: CPT

## 2022-02-22 RX ORDER — AMOXICILLIN 250 MG
1 CAPSULE ORAL 2 TIMES DAILY
Status: DISCONTINUED | OUTPATIENT
Start: 2022-02-22 | End: 2022-02-24 | Stop reason: HOSPADM

## 2022-02-22 RX ORDER — HYDROCODONE BITARTRATE AND ACETAMINOPHEN 5; 325 MG/1; MG/1
1 TABLET ORAL
Status: DISCONTINUED | OUTPATIENT
Start: 2022-02-22 | End: 2022-02-24 | Stop reason: HOSPADM

## 2022-02-22 RX ORDER — POLYETHYLENE GLYCOL 3350 17 G/17G
17 POWDER, FOR SOLUTION ORAL DAILY
Status: DISCONTINUED | OUTPATIENT
Start: 2022-02-23 | End: 2022-02-24 | Stop reason: HOSPADM

## 2022-02-22 RX ORDER — HYDROCODONE BITARTRATE AND ACETAMINOPHEN 10; 325 MG/1; MG/1
2 TABLET ORAL
Status: DISCONTINUED | OUTPATIENT
Start: 2022-02-22 | End: 2022-02-22

## 2022-02-22 RX ADMIN — DEXAMETHASONE 4 MG: 4 TABLET ORAL at 09:12

## 2022-02-22 RX ADMIN — POLYETHYLENE GLYCOL 3350 17 G: 17 POWDER, FOR SOLUTION ORAL at 09:11

## 2022-02-22 RX ADMIN — HYDROCODONE BITARTRATE AND ACETAMINOPHEN 1 TABLET: 5; 325 TABLET ORAL at 17:54

## 2022-02-22 RX ADMIN — SODIUM CHLORIDE, PRESERVATIVE FREE 10 ML: 5 INJECTION INTRAVENOUS at 22:04

## 2022-02-22 RX ADMIN — HYDROCODONE BITARTRATE AND ACETAMINOPHEN 1 TABLET: 5; 325 TABLET ORAL at 22:00

## 2022-02-22 RX ADMIN — ACETAMINOPHEN 325MG 650 MG: 325 TABLET ORAL at 05:46

## 2022-02-22 RX ADMIN — SODIUM CHLORIDE, PRESERVATIVE FREE 10 ML: 5 INJECTION INTRAVENOUS at 17:55

## 2022-02-22 RX ADMIN — HYDROCODONE BITARTRATE AND ACETAMINOPHEN 1 TABLET: 5; 325 TABLET ORAL at 09:12

## 2022-02-22 RX ADMIN — DOCUSATE SODIUM 50MG AND SENNOSIDES 8.6MG 1 TABLET: 8.6; 5 TABLET, FILM COATED ORAL at 17:54

## 2022-02-22 RX ADMIN — DOCUSATE SODIUM 50 MG AND SENNOSIDES 8.6 MG 1 TABLET: 8.6; 5 TABLET, FILM COATED ORAL at 09:12

## 2022-02-22 RX ADMIN — HYDROCODONE BITARTRATE AND ACETAMINOPHEN 1 TABLET: 5; 325 TABLET ORAL at 14:09

## 2022-02-22 NOTE — PROGRESS NOTES
Hospitalist Progress Note    NAME: Radha Roth   :  1960   MRN:  [id number]       Assessment / Plan:  Acute compression fracture POA  Secondary to ground-level fall  Neurosurgery recommended brace  MRI report noted with moderate compression fracture. Anxiety symptom management. Pain control  PT note reviewed  We will plan on possible discharge tomorrow morning.  / Body mass index is 22.16 kg/m². Estimated discharge date: 2022  Barriers: No barriers    Code status: Full  Prophylaxis: SCD  Recommended Disposition: Home with outpatient therapy. Subjective:     Chief Complaint / Reason for Physician Visit    Discussed with RN events overnight. Patient seen and examined at bedside no new complaints. Review of Systems:  Symptom Y/N Comments  Symptom Y/N Comments   Fever/Chills    Chest Pain     Poor Appetite    Edema     Cough    Abdominal Pain     Sputum    Joint Pain     SOB/HUSAIN    Pruritis/Rash     Nausea/vomit    Tolerating PT/OT     Diarrhea    Tolerating Diet     Constipation    Other       Could NOT obtain due to:      Objective:     VITALS:   Last 24hrs VS reviewed since prior progress note. Most recent are:  Patient Vitals for the past 24 hrs:   Temp Pulse Resp BP SpO2   22 0800 99 °F (37.2 °C) 76 16 (!) 115/46 95 %   22 0327 99.4 °F (37.4 °C) 72 16 (!) 110/55 95 %   22 2346 97.9 °F (36.6 °C) 68 16 (!) 114/56 96 %   22 2000 99.9 °F (37.7 °C) 72 16 (!) 112/53 95 %   22 1526 98.5 °F (36.9 °C) 72 16 (!) 116/45 95 %       Intake/Output Summary (Last 24 hours) at 2022 1433  Last data filed at 2022 1020  Gross per 24 hour   Intake 1610 ml   Output 1240 ml   Net 370 ml        I had a face to face encounter and independently examined this patient on 2022, as outlined below:  PHYSICAL EXAM:  General: WD, WN. Alert, cooperative, no acute distress    EENT:  EOMI. Anicteric sclerae. MMM  Resp:  CTA bilaterally, no wheezing or rales.   No accessory muscle use  CV:  Regular  rhythm,  No edema  GI:  Soft, Non distended, Non tender. +Bowel sounds  Neurologic:  Alert and oriented X 3, normal speech,   Psych:   Good insight. Not anxious nor agitated  Skin:  No rashes. No jaundice    Reviewed most current lab test results and cultures  YES  Reviewed most current radiology test results   YES  Review and summation of old records today    NO  Reviewed patient's current orders and MAR    YES  PMH/SH reviewed - no change compared to H&P  ________________________________________________________________________  Care Plan discussed with:    Comments   Patient x    Family      RN x    Care Manager     Consultant                        Multidiciplinary team rounds were held today with , nursing, pharmacist and clinical coordinator. Patient's plan of care was discussed; medications were reviewed and discharge planning was addressed. ________________________________________________________________________  Total NON critical care TIME:     Total CRITICAL CARE TIME Spent:   Minutes non procedure based      Comments   >50% of visit spent in counseling and coordination of care     ________________________________________________________________________  Wily Holman MD     Procedures: see electronic medical records for all procedures/Xrays and details which were not copied into this note but were reviewed prior to creation of Plan. LABS:  I reviewed today's most current labs and imaging studies.   Pertinent labs include:  Recent Labs     02/21/22  0443 02/20/22  1605   WBC 5.8 8.9   HGB 13.2 14.8   HCT 40.9 44.1   * 144*     Recent Labs     02/22/22  0217 02/21/22  0443 02/20/22  1605    140 142   K 3.7 3.8 3.9    106 104   CO2 28 29 28   GLU 95 98 110*   BUN 16 19 19   CREA 0.63 0.61 0.75   CA 8.3* 8.7 9.5   ALB  --  3.3*  --    TBILI  --  0.7  --    ALT  --  21  --        Signed: Wily Holman MD

## 2022-02-22 NOTE — PROGRESS NOTES
Pt admitted earlier this morning for falls , found to have compression fracture of l2   Seen by neuro surgery who recommended brace and pain control  Pt seen and evaluated   C/o nausea since she took norco , doesn't want [place] anymore   C/w Iv morphine which she tolerates  Prn zofran and xompazine and IVF   Non billable round

## 2022-02-22 NOTE — PROGRESS NOTES
End of Shift Note    Bedside shift change report given to Mainor Acuña RN (oncoming nurse) by Mariano Olson (offgoing nurse). Report included the following information SBAR, Kardex, Intake/Output, MAR, Accordion, Recent Results and Med Rec Status    Shift worked:  night     Shift summary and any significant changes:    Straight cathed at 5487     Concerns for physician to address:  none     Zone phone for oncoming shift:   [phone]       Activity:  Activity Level: Logroll  Number times ambulated in hallways past shift: 0  Number of times OOB to chair past shift: 0    Cardiac:   Cardiac Monitoring: No           Access:   Current line(s): PIV     Genitourinary:   Urinary status: voiding and external catheter    Respiratory:      Chronic home O2 use?: NO  Incentive spirometer at bedside: NO     GI:  Last Bowel Movement Date: 02/20/22  Current diet:  ADULT DIET Regular; Low Fat/Low Chol/High Fiber/2 gm Na  Passing flatus: YES  Tolerating current diet: YES       Pain Management:   Patient states pain is manageable on current regimen: YES    Skin:  Salvador Score: 18  Interventions: float heels and increase time out of bed    Patient Safety:  Fall Score:  Total Score: 3  Interventions: assistive device (walker, cane, etc), gripper socks and pt to call before getting OOB  High Fall Risk: Yes    Length of Stay:  Expected LOS: - - -  Actual LOS: 1      Mariano Olson

## 2022-02-22 NOTE — PROGRESS NOTES
End of Shift Note    Bedside shift change report given to Jerome COOLEY (oncoming nurse) by Martin Patton RN (offgoing nurse).   Report included the following information SBAR, Kardex, Intake/Output, MAR and Recent Results    Shift worked:  3462-0220     Shift summary and any significant changes:     norco given x1, ambulating with walker to [address], consult to IR sent for evaluation for kyphoplasty     Concerns for physician to address:  none     Zone phone for oncoming shift:   [phone]

## 2022-02-22 NOTE — PROGRESS NOTES
Ambulatory with brace  Persistent back pain, will ask IR to consult  For possible kyphoplasty  Stable neuro exam AMAYA well

## 2022-02-22 NOTE — PROGRESS NOTES
Occupational Therapy  Orders received and medical record reviewed. Pt was agreeable to participating in OT Evaluation. She was very painful/miserable, moving very slowly during adl mobility. Pt was extremely painful during transitional movements. She reported 7/10 pain at end of tx session and nurse was informed. Educated on performing adls and adhering to back precautions as well as appropriate DME  and adaptive aids to increase safety and independence. Pt will benefit from RW, BSC, safety frame over toilet, grabbar at tub, toilet aide. She will need 24 hour assistance at home and direct 1:1 support during adl mobility. Pt reports that she will likely have family support at home. No further acute OT needed. Full OT note to follow.

## 2022-02-22 NOTE — PROGRESS NOTES
Problem: Mobility Impaired (Adult and Pediatric)  Goal: *Acute Goals and Plan of Care (Insert Text)  Description: FUNCTIONAL STATUS PRIOR TO ADMISSION: Patient was independent and active without use of DME.    HOME SUPPORT PRIOR TO ADMISSION: The patient lived alone with sister local to provide assistance(did not require assistance PTA). Physical Therapy Goals  Initiated 2/22/2022    1. Patient will move from supine to sit and sit to supine , scoot up and down, and roll side to side in bed with independence within 4 days. 2. Patient will perform sit to stand with independence within 4 days. 3. Patient will ambulate with independence for 300 feet with the least restrictive device within 4 days. 4. Patient will ascend/descend 5 stairs with 1 handrail(s) with supervision/set-up within 4 days. 5. Patient will verbalize and demonstrate understanding of spinal precautions (No bending, lifting greater than 5 lbs, or twisting; log-roll technique; frequent repositioning as instructed) within 4 days. Outcome: Progressing Towards Goal   PHYSICAL THERAPY EVALUATION  Patient: Manuela Dodge (43 y.o. female)  Date: 2/22/2022  Primary Diagnosis: Closed compression fracture of lumbosacral spine (HCC) [S32.000A]        Precautions:   Back    ASSESSMENT  Based on the objective data described below, the patient presents with back pain up to 7/10 with mobility and transfers, decreased ROM 2/2 pain, dizziness(vitals stable), impaired standing balance and functional mobility below baseline following admission for fall at home. Pt fell while standing on bed attempting to hang a picture. Found to have acquired an L2 compression fracture. Educated on back precautions, brace use and log rolling. Pt donned LSO with Min A d/t pain with pulling brace around torso. Pt requires up to [address] for log rolling and transfer to standing. Ambulates with RW and w/o AD x 225ft. Reporting improved pain levels once upright.  Negotiates 4 steps with single railing and CGA(utilizing lateral approach for increased confidence). Pt declined remaining up in chair d/t continued dizziness and assisted back to supine. Recommend family assistance. HHPT and RW for home use. Current Level of Function Impacting Discharge (mobility/balance): up to Min A for mobility 2/2 pain    Functional Outcome Measure: The patient scored 15/28 on the Tinetti outcome measure which is indicative of high fall risk. Other factors to consider for discharge: pain control     Patient will benefit from skilled therapy intervention to address the above noted impairments. PLAN :  Recommendations and Planned Interventions: bed mobility training, transfer training, gait training, therapeutic exercises, neuromuscular re-education, patient and family training/education, and therapeutic activities      Frequency/Duration: Patient will be followed by physical therapy:  5 times a week to address goals. Recommendation for discharge: (in order for the patient to meet his/her long term goals)  Physical therapy at least 2 days/week in the home     This discharge recommendation:  Has been made in collaboration with the attending provider and/or case management    IF patient discharges home will need the following DME: rolling walker         SUBJECTIVE:   Patient stated I'm glad I'm not stuck in bed anymore.     OBJECTIVE DATA SUMMARY:   HISTORY:    Past Medical History:   Diagnosis Date    Anxiety     Hypertension      Past Surgical History:   Procedure Laterality Date    HX CYST REMOVAL  1972       Personal factors and/or comorbidities impacting plan of care: anxiety, HTN, compression fracture    Home Situation  Home Environment: Private residence  # Steps to Enter: 4  Rails to Enter: Yes  Hand Rails : Bilateral (wide)  Wheelchair Ramp: No  One/Two Story Residence: One story  Living Alone: Yes  Support Systems: Other Family Member(s)  Patient Expects to be Discharged to[de-identified] Home  Current DME Used/Available at Home: None  Tub or Shower Type: Tub    EXAMINATION/PRESENTATION/DECISION MAKING:   Critical Behavior:  Neurologic State: Alert  Orientation Level: Appropriate for age  Cognition: Appropriate decision making,Appropriate for age attention/concentration,Follows commands  Safety/Judgement: Awareness of environment,Fall prevention,Home safety  Hearing: Auditory  Auditory Impairment: None  Skin:    Edema:   Range Of Motion:  AROM: Generally decreased, functional (d/t pain)                       Strength:    Strength: Within functional limits                    Tone & Sensation:   Tone: Normal              Sensation: Intact               Coordination:  Coordination: Within functional limits  Vision:   Acuity:  (not formally assessed)  Functional Mobility:  Bed Mobility:  Rolling: Contact guard assistance  Supine to Sit: Contact guard assistance; Additional time  Sit to Supine: Minimum assistance (LE management)  Scooting: Supervision;Stand-by assistance  Transfers:  Sit to Stand: Contact guard assistance; Additional time  Stand to Sit: Contact guard assistance; Additional time                       Balance:   Sitting: Intact  Sitting - Static: Good (unsupported)  Sitting - Dynamic: Good (unsupported)  Standing: Impaired  Standing - Static: Good;Constant support  Standing - Dynamic : Fair;Constant support  Ambulation/Gait Training:  Distance (ft): 225 Feet (ft)  Assistive Device: Gait belt;Walker, rolling;Brace/Splint  Ambulation - Level of Assistance: Contact guard assistance     Gait Description (WDL): Exceptions to WDL  Gait Abnormalities: Antalgic;Decreased step clearance;Shuffling gait        Base of Support: Narrowed     Speed/Jazz: Slow;Shuffled;Pace decreased (<100 feet/min)  Step Length: Right shortened;Left shortened                     Stairs:  Number of Stairs Trained: 4  Stairs - Level of Assistance: Minimum assistance   Rail Use: Right     Therapeutic Exercises: Functional Measure:  Tinetti test:    Sitting Balance: 1  Arises: 1  Attempts to Rise: 1  Immediate Standing Balance: 1  Standing Balance: 1  Nudged: 0  Eyes Closed: 0  Turn 360 Degrees - Continuous/Discontinuous: 0  Turn 360 Degrees - Steady/Unsteady: 1  Sitting Down: 1  Balance Score: 7 Balance total score  Indication of Gait: 1  R Step Length/Height: 1  L Step Length/Height: 1  R Foot Clearance: 1  L Foot Clearance: 1  Step Symmetry: 1  Step Continuity: 1  Path: 1  Trunk: 0  Walking Time: 0  Gait Score: 8 Gait total score  Total Score: 15/28 Overall total score         Tinetti Tool Score Risk of Falls  <19 = High Fall Risk  19-24 = Moderate Fall Risk  25-28 = Low Fall Risk  Tinetti ME. Performance-Oriented Assessment of Mobility Problems in Elderly Patients. Rogers 64; [id number].  (Scoring Description: PT Bulletin Feb. 10, 1993)    Older adults: Chandan Lemons et al, 2009; n = 1000 [facility] elderly evaluated with ABC, ISAK, ADL, and IADL)  · Mean ISAK score for males aged 69-68 years = 26.21(3.40)  · Mean ISAK score for females age 69-68 years = 25.16(4.30)  · Mean ISAK score for males over 80 years = 23.29(6.02)  · Mean ISAK score for females over 80 years = 17.20(8.32)            Physical Therapy Evaluation Charge Determination   History Examination Presentation Decision-Making   MEDIUM  Complexity : 1-2 comorbidities / personal factors will impact the outcome/ POC  LOW Complexity : 1-2 Standardized tests and measures addressing body structure, function, activity limitation and / or participation in recreation  LOW Complexity : Stable, uncomplicated  Other outcome measures Tinetti  LOW       Based on the above components, the patient evaluation is determined to be of the following complexity level: Low    Pain Ratin/10 at most, 4/10 at best    Activity Tolerance:   Good    After treatment patient left in no apparent distress:   Supine in bed and Call bell within reach    COMMUNICATION/EDUCATION:   The patients plan of care was discussed with: Registered nurse. Fall prevention education was provided and the patient/caregiver indicated understanding., Patient/family have participated as able in goal setting and plan of care. , and Patient/family agree to work toward stated goals and plan of care.     Thank you for this referral.  Yancy Colunga, PT   Time Calculation: 41 mins

## 2022-02-22 NOTE — PROGRESS NOTES
Transition of Care Plan:  RUR: 7% low   Disposition: home with home health   Follow up appointments: ortho  DME needed: RW  Transportation at Discharge: sister   [address] or means to access home: sister to provide   IM Medicare Letter: n/a commercial insurance   Is patient a BCPI-A Bundle: no   If yes, was Bundle Letter given?:    Is patient a  and connected with the [place]? no             If yes, was [employer] transfer form completed and [place] notified? Caregiver Contact: sister Mac Cain [phone]  Discharge Caregiver contacted prior to discharge? yes  Care Conference needed?: no                 Reason for Admission: L2 compression fracture                     RUR Score:  7%                   Plan for utilizing home health: per recommendation         PCP: First and Last name:  Roma Alves NP   Name of Practice: [facility]   Are you a current patient: Yes/No: yes   Approximate date of last visit: 9/20/21   Can you participate in a virtual visit with your PCP: yes                    Current Advanced Directive/Advance Care Plan: Puneet Patton (ACP) Conversation      Date of Conversation: 2/22/22  Conducted with: Patient with Decision Making Capacity    Content/Action Overview:   DECLINED ACP conversation - will revisit periodically     Healthcare Decision Maker:   Click here to complete [address] including selection of the Healthcare Decision Maker Relationship (ie \"Primary\")                       Transition of Care Plan:                      CM made room visit with patient who was alert and oriented. Pt confirmed demographics, insurance, and emergency contact on file. Pt uses [facility] pharmacy in [facility]. Pt lives alone in a single level home with 4 jennifer. Pt has a raised toilet seat. Will need a RW prior to d/c. At baseline pt is independent with ADLs and driving. Pt has no hx of HH, SNF, or IPR.      Pt's plan is to dc home with home health. Pt's sister lives 8 miles away and very supportive. Pt's brother and sister in law will be coming into town this Thursday to stay with patient for a week. Sister can assist until then. Sister also to provide transportation at d/c. Pt does not have a preference in [facility]. CM to send referrals to see who is able to accept. Pt also in need of RW. Care Management Interventions  PCP Verified by CM: Yes  Mode of Transport at Discharge:  Other (see comment) (sister)  Transition of Care Consult (CM Consult): Discharge [address]: No  Reason Outside [place]: Out of service area  Discharge Durable Medical Equipment: Yes  Physical Therapy Consult: Yes  Occupational Therapy Consult: Yes  Speech Therapy Consult: No  Support Systems: Other Family Member(s)  Confirm Follow Up Transport: Family  Discharge Location  Patient Expects to be Discharged to[de-identified] Home with home health    Warren Garibay, [address]

## 2022-02-22 NOTE — PROGRESS NOTES
Problem: Pressure Injury - Risk of  Goal: *Prevention of pressure injury  Description: Document Salvador Scale and appropriate interventions in the flowsheet.   Outcome: Progressing Towards Goal  Note: Pressure Injury Interventions:       Moisture Interventions: Absorbent underpads    Activity Interventions: Increase time out of bed    Mobility Interventions: Float heels    Nutrition Interventions: Document food/fluid/supplement intake                     Problem: Patient Education: Go to Patient Education Activity  Goal: Patient/Family Education  Outcome: Progressing Towards Goal

## 2022-02-22 NOTE — PROGRESS NOTES
OCCUPATIONAL THERAPY EVALUATION/DISCHARGE  Patient: Parul Schaefer (11 y.o. female)  Date: 2/22/2022  Primary Diagnosis: Closed compression fracture of lumbosacral spine ([facility] [address] [S32.000A]       Precautions: fall, spinal       ASSESSMENT  Based on the objective data described below, the patient presents with severe pain (which is her main issue at this time), poor tolerance for sitting and increased pain during transitional movements. Pt reported feeling dizzy when OOB, this did not change during OOB adls, and resolved when returned to bed. (nurse informed). During adls, pt performed all tasks very slowly due to pain and fear of pain. She is able to use adaptive techniques (I.e. crossed leg)  and was educated in performing adls using back brace and adhering to back precautions. Pt will need RW, BSC, reacher, toilet adaptation (safety frame) and toilet jose antonio at discharge to increase safety, comfort, and independence during adls and adl mobility. No further OT services needed at this time; education completed--pain is limiting adl performance. Current Level of Function (ADLs/self-care): up to minimal assistance, CGA for using RW during adl mobility. Pain is pt's limiting factor during adl performance. Functional Outcome Measure: The patient scored 55/100 on the Barthel Index outcome measure which is indicative of partially dependent performance. Other factors to consider for discharge: pt lives alone      PLAN :  Recommendation for discharge: (in order for the patient to meet his/her long term goals)  No skilled occupational therapy/ follow up rehabilitation needs identified at this time.     This discharge recommendation:  Has not yet been discussed the attending provider and/or case management    IF patient discharges home will need the following DME: bedside commode, walker: rolling and safety frame on toilet       SUBJECTIVE:   Patient stated It's 7/10.  (pain is most limiting factor at this time)    OBJECTIVE DATA SUMMARY:   HISTORY:   Past Medical History:   Diagnosis Date    Anxiety     Hypertension      Past Surgical History:   Procedure Laterality Date    HX CYST REMOVAL  1972       Prior Level of Function/Environment/Context: pt reports independence in adls and IadLs lives alone. Reports her sister lives close, but has a planned vacation approaching. Pt was trying to make arrangements for assistance at home. Pt moved from [place]. Expanded or extensive additional review of patient history:     Home Situation  Home Environment: Private residence  # Steps to Enter: 4  Rails to Enter: Yes  Hand Rails : Bilateral (wide)  Wheelchair Ramp: No  One/Two Story Residence: One story  Living Alone: Yes  Support Systems: Other Family Member(s)  Patient Expects to be Discharged to[de-identified] Home  Current DME Used/Available at Home: None  Tub or Shower Type: Tub    Hand dominance: Right    EXAMINATION OF PERFORMANCE DEFICITS:  Cognitive/Behavioral Status:  Neurologic State: Alert  Orientation Level: Appropriate for age  Cognition: Appropriate decision making; Appropriate for age attention/concentration; Follows commands  Perception: Appears intact  Perseveration: No perseveration noted  Safety/Judgement: Awareness of environment; Fall prevention;Home safety    Skin: generally intact    Edema: none observed. Pt was using ice pack on R flank/low back    Hearing:     intact  Vision/Perceptual:                           Acuity:  (not formally assessed)         Range of Motion:  BUEs  Within functional limits                            Strength:  Overall, generally decreased, functional  BUEs:  Within functional limits                   Coordination:     Fine Motor Skills-Upper: Left Intact; Right Intact    Gross Motor Skills-Upper: Left Intact; Right Intact    Tone & Sensation:   Tone is normal  Sensation is intact                            Balance:  Sitting: Impaired (poor tolerance forsitting due to pain)  Sitting - Static: Fair (occasional)  Sitting - Dynamic:  (not tested---pt very painful)  Standing: Impaired  Standing - Static: Constant support;Good  Standing - Dynamic : Constant support; Fair    Functional Mobility and Transfers for ADLs:  Bed Mobility:  Rolling: Minimum assistance; Moderate assistance (for log roll technique--very painful)  Supine to Sit: Minimum assistance; Moderate assistance  Sit to Supine: Minimum assistance  Scooting: Supervision;Stand-by assistance    Transfers:  Sit to Stand: Contact guard assistance; Additional time (up to stand RW for support, very slowly and painfully)  Stand to Sit: Contact guard assistance; Additional time (slowly and painfully)  Bathroom Mobility: Contact guard assistance (slowly, painfully and using RW)  Toilet Transfer : Contact guard assistance; Additional time (slowly and painfully)  Assistive Device : Walker    ADL Assessment:  Feeding: Setup    Oral Facial Hygiene/Grooming: Stand-by assistance;Setup    Bathing: Minimum assistance; Additional time    Upper Body Dressing: Minimum assistance; Additional time    Lower Body Dressing: Minimum assistance; Additional time    Toileting: Stand by assistance; Additional time (educ in possible need for toilet tongs to prevent twisting)                ADL Intervention and task modifications:   Pt was educated in performing adls using adaptive techniques, appropriate AE and DME, fall prevention, home safety, deep breathing for pain management, use of back brace during adls. Cognitive Retraining  Safety/Judgement: Awareness of environment; Fall prevention;Home safety    Patient instructed and demonstrated 3/3 spine precautions with minimal cues. Patient instructed and indicated understanding the benefits of maintaining activity tolerance, functional mobility, and independence with self care tasks during acute stay  to ensure safe return home and to baseline.  Encouraged patient to increase frequency and duration OOB, not sitting longer than 30 mins without marching/walking with staff, be out of bed for all meals, perform daily ADLs, and performing functional mobility to/from bathroom. Patient instruction and indicated understanding on body mechanics, ergonomics and gravitational force on the spine during different body positions to plan activities in prep for return home to complete basic ADLs  Patient instructed  while supine-  hip ER stretch and hold 10 seconds to increase ROM in prep for lower body ADLs daily . Dressing brace: Patient instructed and demonstrated to don/doff velcro on brace using dominant side, keeping non-dominant side intact. Patient instructed and demonstrated in meantime of being able to stand with back against wall to don/doff brace, to don/doff seated using lap and bed/chair surface to support brace while manipulating. Dressing lower body: Patient instructed to don brace first and on the benefits to remain seated to don all clothing to increase independence with precautions and pain management. Patient instructed and demonstrated tailor sitting for lower body dressing with cues. Toileting: Patient instructed on the benefits of using flushable wet wipes and toilet tongs if decreased reach for sonny care. Also, the benefits of a reacher to aid in clothing management. Educated to wear elastic waist clothing for comfort and ease in dressing/undressing. Patient instruction and indicated understanding to not strain i.e. holding breath to bear down during a bowel movement. Home safety: Patient instructed and indicated understanding on home modifications and safety [raise height of ADL objects (i.e. clothing, sink items, fridge items, items to mouth when grooming), appropriate height of chair surfaces, recliner safety, change of floor surfaces, clear pathways] to increase independence and fall prevention.     Standing: Patient instructed and indicated understanding to walk up to sink/counter top/surfaces, step into walker, square off while using objects, slide objects along surfaces, to increase adherence to back precautions and fall prevention. Patient instructed to increase amount of time standing in order to increase independence and tolerance with ADLs  Tub transfer: Patient instructed and indicated understanding regarding when it is safe to begin transfer into tub (complete stairs with PT, advance exercises with PT high enough to clear tub height, and while clothes donned practice with another person present). Pt is aware that she will likely need a grab bar for support in her tub shower. She is familiar and reports that she will seek DME online    Therapeutic Exercise:  Patient instructed on the benefits shoulder flexion exercises to increase independence with ADLs, active ROM, and strength of spine. Patient demonstrated 3 reps and 1 set(s) while sitting EOB with Supervision. Functional Measure:    Barthel Index:  Bathin  Bladder: 10  Bowels: 10  Groomin  Dressin  Feeding: 10  Mobility: 0  Stairs: 0  Toilet Use: 5  Transfer (Bed to Chair and Back): 10  Total: 55/100      The Barthel ADL Index: Guidelines  1. The index should be used as a record of what a patient does, not as a record of what a patient could do. 2. The main aim is to establish degree of independence from any help, physical or verbal, however minor and for whatever reason. 3. The need for supervision renders the patient not independent. 4. A patient's performance should be established using the best available evidence. Asking the patient, friends/relatives and nurses are the usual sources, but direct observation and common sense are also important. However direct testing is not needed. 5. Usually the patient's performance over the preceding 24-48 hours is important, but occasionally longer periods will be relevant.   6. Middle categories imply that the patient supplies over 50 per cent of the effort. 7. Use of aids to be independent is allowed. Score Interpretation (from [facility])    Independent   60-79 Minimally independent   40-59 Partially dependent   20-39 Very dependent   <20 Totally dependent     -Max Awad., Barthel, D.W. (1965). Functional evaluation: the Barthel Index. [address] ([address] (). The Barthel activities of daily living index: self-reporting versus actual performance in the old (> or = 75 years). Journal of [address] [place] [address] 45(7), [facility], J.IGNACIOF, Pal Patel., Johnson Calvo. (1999). Measuring the change in disability after inpatient rehabilitation; comparison of the responsiveness of the Barthel Index and Functional [place] Measure. Journal of Neurology, Neurosurgery, and Psychiatry, 66(4), 407-806. EMILIE Gonsalez, JULIO CESAR Barrera, & Oniel Christopher MELLYN. (2004) Assessment of post-stroke quality of life in cost-effectiveness studies: The usefulness of the Barthel Index and the EuroQoL-5D. Quality of Life Research, 15, 978-22       Occupational Therapy Evaluation Charge Determination   History Examination Decision-Making   MEDIUM Complexity : Expanded review of history including physical, cognitive and psychosocial  history  MEDIUM Complexity : 3-5 performance deficits relating to physical, cognitive , or psychosocial skils that result in activity limitations and / or participation restrictions MEDIUM Complexity : Patient may present with comorbidities that affect occupational performnce.  Miniml to moderate modification of tasks or assistance (eg, physical or verbal ) with assesment(s) is necessary to enable patient to complete evaluation       Based on the above components, the patient evaluation is determined to be of the following complexity level: MEDIUM  Pain Ratin/10--nurse informed    Activity Tolerance:   Fair  Pain is limiting patient    After treatment patient left in no apparent distress:    Supine in bed, Call bell within reach, Side rails x 3 and pt set up for lunch/unable to tolerate sitting    COMMUNICATION/EDUCATION:   The patients plan of care was discussed with: Physical therapist and Registered nurse.      Thank you for this referral.  Janae Giraldo, OTR/L  Time Calculation: 47 mins

## 2022-02-22 NOTE — PROGRESS NOTES
Neurosurgery NP Note:    Pt seen with Dr Trudy Mendes. She reports pain improved from yesterday and tolerating Norco w/o nausea   However she still appears very uncomfortable and pain limiting her mobility   Currently pain has been exacerbated by recent therapy session. Discussed with patient treatment options and potential for Kyphoplasty. Pt understanding and in agreement, would like to avoid surgery at this time as well.      Plans:  1) Consult IR for Kypho  2) PT/OT - Continue to use LSO with activity   3) Norco for pain control   4) Bowel regimen added with using narcotics       Melissa Sims NP

## 2022-02-23 ENCOUNTER — APPOINTMENT (OUTPATIENT)
Dept: INTERVENTIONAL RADIOLOGY/VASCULAR | Age: 62
End: 2022-02-23
Attending: NURSE PRACTITIONER
Payer: COMMERCIAL

## 2022-02-23 PROBLEM — M54.9 INTRACTABLE BACK PAIN: Status: ACTIVE | Noted: 2022-02-23

## 2022-02-23 LAB
INR PPP: 1 (ref 0.9–1.1)
PROTHROMBIN TIME: 10.7 SEC (ref 9–11.1)

## 2022-02-23 PROCEDURE — 77030030399

## 2022-02-23 PROCEDURE — 74011250636 HC RX REV CODE- 250/636: Performed by: STUDENT IN AN ORGANIZED HEALTH CARE EDUCATION/TRAINING PROGRAM

## 2022-02-23 PROCEDURE — C1713 ANCHOR/SCREW BN/BN,TIS/BN: HCPCS

## 2022-02-23 PROCEDURE — 99152 MOD SED SAME PHYS/QHP 5/>YRS: CPT

## 2022-02-23 PROCEDURE — 77030003666 HC NDL SPINAL BD -A

## 2022-02-23 PROCEDURE — 74011000250 HC RX REV CODE- 250: Performed by: STUDENT IN AN ORGANIZED HEALTH CARE EDUCATION/TRAINING PROGRAM

## 2022-02-23 PROCEDURE — 97116 GAIT TRAINING THERAPY: CPT

## 2022-02-23 PROCEDURE — 77030040175 HC TAMP SPN BN INFLT KYPH MEDT -I1

## 2022-02-23 PROCEDURE — G0378 HOSPITAL OBSERVATION PER HR: HCPCS

## 2022-02-23 PROCEDURE — 2709999900 HC NON-CHARGEABLE SUPPLY

## 2022-02-23 PROCEDURE — 97530 THERAPEUTIC ACTIVITIES: CPT

## 2022-02-23 PROCEDURE — 74011000250 HC RX REV CODE- 250: Performed by: NURSE PRACTITIONER

## 2022-02-23 PROCEDURE — 74011250637 HC RX REV CODE- 250/637: Performed by: NURSE PRACTITIONER

## 2022-02-23 PROCEDURE — 74011000636 HC RX REV CODE- 636: Performed by: STUDENT IN AN ORGANIZED HEALTH CARE EDUCATION/TRAINING PROGRAM

## 2022-02-23 PROCEDURE — 85610 PROTHROMBIN TIME: CPT

## 2022-02-23 PROCEDURE — 36415 COLL VENOUS BLD VENIPUNCTURE: CPT

## 2022-02-23 PROCEDURE — 77030021782 HC SYS CEM CART DEL KYPH -C

## 2022-02-23 RX ORDER — DIPHENHYDRAMINE HYDROCHLORIDE 50 MG/ML
25-50 INJECTION, SOLUTION INTRAMUSCULAR; INTRAVENOUS ONCE
Status: COMPLETED | OUTPATIENT
Start: 2022-02-23 | End: 2022-02-23

## 2022-02-23 RX ORDER — MIDAZOLAM HYDROCHLORIDE 1 MG/ML
1-5 INJECTION, SOLUTION INTRAMUSCULAR; INTRAVENOUS
Status: DISCONTINUED | OUTPATIENT
Start: 2022-02-23 | End: 2022-02-23

## 2022-02-23 RX ORDER — KETOROLAC TROMETHAMINE 30 MG/ML
15-30 INJECTION, SOLUTION INTRAMUSCULAR; INTRAVENOUS AS NEEDED
Status: DISCONTINUED | OUTPATIENT
Start: 2022-02-23 | End: 2022-02-23

## 2022-02-23 RX ORDER — HEPARIN SODIUM 200 [USP'U]/100ML
400 INJECTION, SOLUTION INTRAVENOUS ONCE
Status: COMPLETED | OUTPATIENT
Start: 2022-02-23 | End: 2022-02-23

## 2022-02-23 RX ORDER — FENTANYL CITRATE 50 UG/ML
100 INJECTION, SOLUTION INTRAMUSCULAR; INTRAVENOUS
Status: DISCONTINUED | OUTPATIENT
Start: 2022-02-23 | End: 2022-02-23

## 2022-02-23 RX ORDER — LIDOCAINE HYDROCHLORIDE 20 MG/ML
18 INJECTION, SOLUTION INFILTRATION; PERINEURAL ONCE
Status: COMPLETED | OUTPATIENT
Start: 2022-02-23 | End: 2022-02-23

## 2022-02-23 RX ORDER — SODIUM CHLORIDE 9 MG/ML
25 INJECTION, SOLUTION INTRAVENOUS CONTINUOUS
Status: DISCONTINUED | OUTPATIENT
Start: 2022-02-23 | End: 2022-02-23

## 2022-02-23 RX ORDER — BUPIVACAINE HYDROCHLORIDE 5 MG/ML
20 INJECTION, SOLUTION EPIDURAL; INTRACAUDAL ONCE
Status: COMPLETED | OUTPATIENT
Start: 2022-02-23 | End: 2022-02-23

## 2022-02-23 RX ADMIN — KETOROLAC TROMETHAMINE 30 MG: 30 INJECTION, SOLUTION INTRAMUSCULAR; INTRAVENOUS at 14:38

## 2022-02-23 RX ADMIN — HYDROCODONE BITARTRATE AND ACETAMINOPHEN 1 TABLET: 5; 325 TABLET ORAL at 09:35

## 2022-02-23 RX ADMIN — MIDAZOLAM HYDROCHLORIDE 1 MG: 1 INJECTION, SOLUTION INTRAMUSCULAR; INTRAVENOUS at 15:14

## 2022-02-23 RX ADMIN — SODIUM CHLORIDE, PRESERVATIVE FREE 10 ML: 5 INJECTION INTRAVENOUS at 05:05

## 2022-02-23 RX ADMIN — SODIUM CHLORIDE, PRESERVATIVE FREE 10 ML: 5 INJECTION INTRAVENOUS at 13:17

## 2022-02-23 RX ADMIN — CEFAZOLIN SODIUM 2 G: 1 INJECTION, POWDER, FOR SOLUTION INTRAMUSCULAR; INTRAVENOUS at 14:38

## 2022-02-23 RX ADMIN — SODIUM CHLORIDE 25 ML/HR: 9 INJECTION, SOLUTION INTRAVENOUS at 15:00

## 2022-02-23 RX ADMIN — FENTANYL CITRATE 50 MCG: 50 INJECTION INTRAMUSCULAR; INTRAVENOUS at 15:06

## 2022-02-23 RX ADMIN — HEPARIN SODIUM IN SODIUM CHLORIDE 400 UNITS: 200 INJECTION INTRAVENOUS at 15:29

## 2022-02-23 RX ADMIN — FENTANYL CITRATE 25 MCG: 50 INJECTION INTRAMUSCULAR; INTRAVENOUS at 15:14

## 2022-02-23 RX ADMIN — DIPHENHYDRAMINE HYDROCHLORIDE 25 MG: 50 INJECTION, SOLUTION INTRAMUSCULAR; INTRAVENOUS at 14:37

## 2022-02-23 RX ADMIN — BUPIVACAINE HYDROCHLORIDE 100 MG: 5 INJECTION, SOLUTION EPIDURAL; INTRACAUDAL; PERINEURAL at 15:09

## 2022-02-23 RX ADMIN — DOCUSATE SODIUM 50MG AND SENNOSIDES 8.6MG 1 TABLET: 8.6; 5 TABLET, FILM COATED ORAL at 17:39

## 2022-02-23 RX ADMIN — IOPAMIDOL 3 ML: 612 INJECTION, SOLUTION INTRATHECAL at 15:09

## 2022-02-23 RX ADMIN — SODIUM CHLORIDE, PRESERVATIVE FREE 10 ML: 5 INJECTION INTRAVENOUS at 22:12

## 2022-02-23 RX ADMIN — LIDOCAINE HYDROCHLORIDE 200 MG: 20 INJECTION, SOLUTION INFILTRATION; PERINEURAL at 15:09

## 2022-02-23 RX ADMIN — MIDAZOLAM HYDROCHLORIDE 1 MG: 1 INJECTION, SOLUTION INTRAMUSCULAR; INTRAVENOUS at 15:06

## 2022-02-23 RX ADMIN — HYDROCODONE BITARTRATE AND ACETAMINOPHEN 1 TABLET: 5; 325 TABLET ORAL at 05:04

## 2022-02-23 NOTE — CONSULTS
[id number] [address]    Name:  Eduard Maier  MR#:  [id number]  :  1960  ACCOUNT #:  [de-identified]  DATE OF SERVICE:  2022    CHIEF COMPLAINT:  Acute L2 compression fracture. HISTORY OF PRESENT ILLNESS:  A 64-year-old woman, fell, initially treated at [facility], transferred to [facility] for further management. Imaging studies revealed a compression fracture involving the superior endplate of L2. She denies any bowel and bladder deficits or numbness and tingling in the lower extremities or weakness of lower extremities. PAST MEDICAL HISTORY:  Anxiety and hypertension. SOCIAL HISTORY:  She is a nonsmoker. Drinks rarely. FAMILY HISTORY:  Unrelated and unremarkable. ALLERGIES:  SULFA ALLERGY. MEDICATIONS:  Include,  1.  CBD oil. 2.  Xanax. 3.  Vitamin B12.  4.  Calcium. REVIEW OF SYSTEMS:  Otherwise noncontributory. No other GI, , respiratory, cardiac, dermatologic, endocrinologic, neurologic complaints. PHYSICAL EXAMINATION:  GENERAL:  She is awake and alert, oriented to person, place, and time. VITAL SIGNS:  Recent vital signs; blood pressure 115/46, temperature 99, O2 sat 95, pulse rate 76, respiratory rate 16. NEUROLOGIC:  Cranial nerve function II through XII normal.  Moves all four extremities equally. I had the opportunity to see and examine her yesterday as well. Toes are downgoing. No clonus at the ankles. Gait and station are not tested. She now has a hard lumbosacral brace. IMAGING:  I reviewed the imaging studies. I do not think she requires any surgery, although if her pain cannot be put under control, she may require stabilization of the fracture. It does not involve more than 50% of the vertebral body. IMPRESSION:  She may benefit from some kyphoplasty, so we put a request in for that as well. There are no focal neurologic deficits.   I would recommend continued use of the brace and we will follow as needed. If she develops any other signs or symptoms or if her pain cannot be gotten under control, she may require stabilization internally with some hardware. Thank you for asking me to see the patient.       Kasandra Cisneros MD      [id number]  D:  02/22/2022 16:15  T:  02/22/2022 20:10  JOB #:  [id number]  CC:  Germain Cross MD

## 2022-02-23 NOTE — H&P
Radiology History and Physical    Patient: Radha Roth 59 y.o. female       Chief Complaint: back pain    History of Present Illness: 59year old woman with L2 compression fracture. No numbness/weakness in the legs. History:    Past Medical History:   Diagnosis Date    Anxiety     Hypertension      Family History   Problem Relation Age of Onset    Diabetes Mother     Stroke Mother     Cancer Father     Heart Disease Father     Hypertension Father     Diabetes Sister     Hypertension Sister     Heart Disease Brother     Hypertension Brother      Social History     Socioeconomic History    Marital status:      Spouse name: Not on file    Number of children: Not on file    Years of education: Not on file    Highest education level: Not on file   Occupational History    Not on file   Tobacco Use    Smoking status: Never Smoker    Smokeless tobacco: Never Used   Substance and Sexual Activity    Alcohol use: Yes     Alcohol/week: 3.0 standard drinks     Types: 3 Cans of beer per week    Drug use: Never    Sexual activity: Not Currently   Other Topics Concern    Not on file   Social History Narrative    Not on file     Social Determinants of Health     Financial Resource Strain:     Difficulty of Paying Living Expenses: Not on file   Food Insecurity:     Worried About Running Out of Food in the Last Year: Not on file    Anupam of Food in the Last Year: Not on file   Transportation Needs:     Lack of Transportation (Medical): Not on file    Lack of Transportation (Non-Medical):  Not on file   Physical Activity:     Days of Exercise per Week: Not on file    Minutes of Exercise per Session: Not on file   Stress:     Feeling of Stress : Not on file   Social Connections:     Frequency of Communication with Friends and Family: Not on file    Frequency of Social Gatherings with Friends and Family: Not on file    Attends Evangelical Services: Not on file   [employer] of Clubs or Organizations: Not on file    Attends Club or Organization Meetings: Not on file    Marital Status: Not on file   Intimate Partner Violence:     Fear of Current or Ex-Partner: Not on file    Emotionally Abused: Not on file    Physically Abused: Not on file    Sexually Abused: Not on file   Housing Stability:     Unable to Pay for Housing in the Last Year: Not on file    Number of Eduardo in the Last Year: Not on file    Unstable Housing in the Last Year: Not on file       Allergies:    Allergies   Allergen Reactions    Sulfa (Sulfonamide Antibiotics) Rash       Current Medications:  Current Facility-Administered Medications   Medication Dose Route Frequency    0.9% sodium chloride infusion  25 mL/hr IntraVENous CONTINUOUS    fentaNYL citrate (PF) injection 100 mcg  100 mcg IntraVENous Multiple    midazolam (VERSED) injection 1-5 mg  1-5 mg IntraVENous Multiple    ceFAZolin (ANCEF) 2 g in sterile water (preservative free) 20 mL IV syringe  2 g IntraVENous ONCE    diphenhydrAMINE (BENADRYL) injection 25-50 mg  25-50 mg IntraVENous ONCE    ketorolac (TORADOL) injection 15-30 mg  15-30 mg IntraVENous PRN    HYDROcodone-acetaminophen (NORCO) 5-325 mg per tablet 1 Tablet  1 Tablet Oral Q4H PRN    polyethylene glycol (MIRALAX) packet 17 g  17 g Oral DAILY    senna-docusate (PERICOLACE) 8.6-50 mg per tablet 1 Tablet  1 Tablet Oral BID    morphine injection 1 mg  1 mg IntraVENous Q4H PRN    cyclobenzaprine (FLEXERIL) tablet 5 mg  5 mg Oral TID PRN    sodium chloride (NS) flush 5-40 mL  5-40 mL IntraVENous Q8H    sodium chloride (NS) flush 5-40 mL  5-40 mL IntraVENous PRN    acetaminophen (TYLENOL) tablet 650 mg  650 mg Oral Q6H PRN    Or    acetaminophen (TYLENOL) suppository 650 mg  650 mg Rectal Q6H PRN    ondansetron (ZOFRAN ODT) tablet 4 mg  4 mg Oral Q8H PRN    Or    ondansetron (ZOFRAN) injection 4 mg  4 mg IntraVENous Q6H PRN    polyethylene glycol (MIRALAX) packet 17 g  17 g Oral DAILY PRN    dexAMETHasone (DECADRON) tablet 4 mg  4 mg Oral DAILY    prochlorperazine (COMPAZINE) with saline injection 5 mg  5 mg IntraVENous Q6H PRN        Physical Exam:  Blood pressure 126/65, pulse 68, temperature 98.1 °F (36.7 °C), resp. rate 18, height 5' 6\" (1.676 m), weight 62.3 kg (137 lb 4.8 oz), SpO2 97 %. GENERAL: alert, cooperative, no distress, appears stated age,   LUNG: Nonlabored respiration on room air  HEART: regular rate and rhythm  Intact sensation and motor in the feet    Alerts:    Hospital Problems  Date Reviewed: 9/20/2021          Codes Class Noted POA    Intractable back pain ICD-10-CM: M54.9  ICD-9-CM: 724.5  2/23/2022 Unknown        Closed compression fracture of lumbosacral spine ([facility] [address] ICD-10-CM: S32.000A  ICD-9-CM: 805.4  2/21/2022 Unknown              Laboratory:      Recent Labs     02/23/22  0408 02/22/22  0217 02/21/22  0443 02/21/22  0443   HGB  --   --   --  13.2   HCT  --   --   --  40.9   WBC  --   --   --  5.8   PLT  --   --   --  131*   INR 1.0  --   --   --    BUN  --  16   < > 19   CREA  --  0.63   < > 0.61   K  --  3.7   < > 3.8    < > = values in this interval not displayed. Plan of Care/Planned Procedure:  Risks, benefits, and alternatives reviewed with patient and she agrees to proceed with the procedure. L2 kyphoplasty    Deemed appropriate for moderate sedation with versed and fentanyl.     Monica Velazquez MD  Interventional Radiology  [facility]  1:28 PM, 2/23/2022

## 2022-02-23 NOTE — PROGRESS NOTES
Problem: Mobility Impaired (Adult and Pediatric)  Goal: *Acute Goals and Plan of Care (Insert Text)  Description: FUNCTIONAL STATUS PRIOR TO ADMISSION: Patient was independent and active without use of DME.    HOME SUPPORT PRIOR TO ADMISSION: The patient lived alone with sister local to provide assistance(did not require assistance PTA). Physical Therapy Goals  Initiated 2/22/2022    1. Patient will move from supine to sit and sit to supine , scoot up and down, and roll side to side in bed with independence within 4 days. 2. Patient will perform sit to stand with independence within 4 days. 3. Patient will ambulate with independence for 300 feet with the least restrictive device within 4 days. 4. Patient will ascend/descend 5 stairs with 1 handrail(s) with supervision/set-up within 4 days. 5. Patient will verbalize and demonstrate understanding of spinal precautions (No bending, lifting greater than 5 lbs, or twisting; log-roll technique; frequent repositioning as instructed) within 4 days. Outcome: Not Met   PHYSICAL THERAPY TREATMENT  Patient: Mauricio Escalona (89 y.o. female)  Date: 2/23/2022  Diagnosis: Closed compression fracture of lumbosacral spine ([facility] [S32.000A]  Intractable back pain [M54.9] <principal problem not specified>       Precautions: Back  Chart, physical therapy assessment, plan of care and goals were reviewed. ASSESSMENT  Patient continues with skilled PT services and is progressing towards goals. Pt presents with decreased strength and increased pain with mobility. Pt performed bed mobility at min A/CGA with additional time due to pain. Pt performed sit to stand transfer at [place] [address] with additional time. Pt ambulated 15ft and 150ft with RW at [place] [address]. Pt moving slowly but with no LOB. Pts pain limiting mobility. Pt leaving with transport at end of session for kypho.        Current Level of Function Impacting Discharge (mobility/balance): mobility at [place] [address]     Other factors to consider for discharge: pain          PLAN :  Patient continues to benefit from skilled intervention to address the above impairments. Continue treatment per established plan of care. to address goals. Recommendation for discharge: (in order for the patient to meet his/her long term goals)  Physical therapy at least 2 days/week in the home     This discharge recommendation:  Has been made in collaboration with the attending provider and/or case management    IF patient discharges home will need the following DME: to be determined (TBD)       SUBJECTIVE:   Patient stated  I was doing something so dumb when it happened.     OBJECTIVE DATA SUMMARY:   Critical Behavior:  Neurologic State: Alert,Appropriate for age  Orientation Level: Oriented X4  Cognition: Appropriate decision making,Appropriate for age attention/concentration,Appropriate safety awareness,Follows commands  Safety/Judgement: Awareness of environment,Fall prevention,Home safety  Functional Mobility Training:  Bed Mobility:  Rolling: Contact guard assistance  Supine to Sit: Contact guard assistance; Additional time;Minimum assistance  Sit to Supine: Contact guard assistance; Additional time  Scooting: Stand-by assistance        Transfers:  Sit to Stand: Contact guard assistance; Additional time  Stand to Sit: Contact guard assistance                             Balance:  Sitting: Intact  Sitting - Static: Good (unsupported)  Sitting - Dynamic: Good (unsupported)  Standing: Impaired  Standing - Static: Good;Constant support  Standing - Dynamic : Fair;Constant support  Ambulation/Gait Training:  Distance (ft): 150 Feet (ft)  Assistive Device: Gait belt;Walker, rolling  Ambulation - Level of Assistance: Contact guard assistance        Gait Abnormalities: Decreased step clearance        Base of Support: Narrowed     Speed/Jazz: Pace decreased (<100 feet/min)  Step Length: Right shortened;Left shortened     Pain Rating:  Pt with increased pain with mobility. Activity Tolerance:   Fair and requires rest breaks    After treatment patient left in no apparent distress: On stretcher with transport     COMMUNICATION/COLLABORATION:   The patients plan of care was discussed with: Registered nurse.      Thom Barreto PTA   Time Calculation: 24 mins

## 2022-02-23 NOTE — PROCEDURES
Interventional Radiology  Procedure Note        2/23/2022 3:28 PM    Patient: Steven Foster     Informed consent obtained    Diagnosis: L2 compression fracture    Procedure(s): L2 kyphoplasty    Specimens removed:  none    Complications: None    Primary Physician: Nita Adler MD    Recommendations: N/A    Discharge Disposition: Stable; return to floor    Full dictated report to follow    Nita Adler MD  Interventional Radiology  [facility]  3:28 PM, 2/23/2022

## 2022-02-23 NOTE — PROGRESS NOTES
12 hr cc   TRANSFER - OUT REPORT:    Verbal report given to Baltazar RN(name) on Jatinder Lehman  being transferred to Radiology(unit) for ordered procedure       Report consisted of patients Situation, Background, Assessment and   Recommendations(SBAR). Information from the following report(s) SBAR, Kardex, Intake/Output, MAR and Recent Results was reviewed with the receiving nurse. Lines:   Peripheral IV 02/20/22 Right Antecubital (Active)   Site Assessment Clean, dry, & intact 02/23/22 0830   Phlebitis Assessment 0 02/23/22 0830   Infiltration Assessment 0 02/23/22 0830   Dressing Status Clean, dry, & intact 02/23/22 0830   Dressing Type Transparent;Tape 02/23/22 0830   Hub Color/Line Status Patent 02/23/22 0830   Action Taken Open ports on tubing capped 02/23/22 0830   Alcohol Cap Used Yes 02/23/22 0830        Opportunity for questions and clarification was provided. Patient transported with:  Transport    Patient will most likely be picked up after noon. TRANSFER - IN REPORT:    Verbal report received from Anna Gonzalez RN(name) on Jatinder Lehman  being received from Radiology(unit) for routine post - op      Report consisted of patients Situation, Background, Assessment and   Recommendations(SBAR). Information from the following report(s) SBAR, Kardex, Intake/Output, MAR and Recent Results was reviewed with the receiving nurse. Opportunity for questions and clarification was provided. Assessment completed upon patients arrival to unit and care assumed. End of Shift Note    Bedside shift change report given to Nery Wallis LPN (oncoming nurse) by Nora Moctezuma RN (offgoing nurse). Report included the following information SBAR, Kardex, Intake/Output, MAR and Recent Results    Shift worked:  Day     Shift summary and any significant changes:     Patient has had stable vital signs and tolerating diet.      Concerns for physician to address:  n/a     Zone phone for oncoming shift:  [phone] Activity:  Activity Level: Up with Assistance  Number times ambulated in hallways past shift: 0  Number of times OOB to chair past shift: 0    Cardiac:   Cardiac Monitoring: No      Cardiac Rhythm: Sinus Rhythm    Access:   Current line(s): PIV     Genitourinary:   Urinary status: due to void    Respiratory:   O2 Device: None (Room air)  Chronic home O2 use?: NO  Incentive spirometer at bedside: YES       GI:  Last Bowel Movement Date: 02/20/22  Current diet:  ADULT DIET Regular  Passing flatus: YES  Tolerating current diet: YES       Pain Management:   Patient states pain is manageable on current regimen: YES    Skin:  Salvador Score: 20  Interventions: float heels and increase time out of bed    Patient Safety:  Fall Score:  Total Score: 4  Interventions: gripper socks and pt to call before getting OOB  High Fall Risk: Yes    Length of Stay:  Expected LOS: - - -  Actual LOS: 2      Megan Lee RN

## 2022-02-23 NOTE — PROGRESS NOTES
Name of procedure: kyphoplasty L-2 - Dr Cassidy Eubanks    Sedation medications given:     Versed: 2mg    Fentanyl: 75mcg    Sedation tolerated: well    Total Sedation time: 25 mins    Sedation start:  1506  Sedation end:   1531    Vital Signs: stable    Fluids removed: n/a    Samples sent to lab: n/a    Any complications related to procedure: none    Post Procedure Care Needed/order sets placed in connect care: yes

## 2022-02-23 NOTE — PROGRESS NOTES
Problem: Pressure Injury - Risk of  Goal: *Prevention of pressure injury  Description: Document Salvador Scale and appropriate interventions in the flowsheet. Outcome: Progressing Towards Goal  Note: Pressure Injury Interventions:  Sensory Interventions: Assess changes in LOC    Moisture Interventions: Minimize layers    Activity Interventions: Increase time out of bed    Mobility Interventions: Float heels    Nutrition Interventions: Document food/fluid/supplement intake    Friction and Shear Interventions: Minimize layers                Problem: Patient Education: Go to Patient Education Activity  Goal: Patient/Family Education  Outcome: Progressing Towards Goal     Problem: Falls - Risk of  Goal: *Absence of Falls  Description: Document Leonardo Fall Risk and appropriate interventions in the flowsheet. Outcome: Progressing Towards Goal  Note: Fall Risk Interventions:  Mobility Interventions: Communicate number of staff needed for ambulation/transfer         Medication Interventions: Patient to call before getting OOB,Teach patient to arise slowly    Elimination Interventions: Call light in reach    History of Falls Interventions: Consult care management for discharge planning         Problem: Falls - Risk of  Goal: *Absence of Falls  Description: Document Elana Puga Fall Risk and appropriate interventions in the flowsheet.   Outcome: Progressing Towards Goal  Note: Fall Risk Interventions:  Mobility Interventions: Communicate number of staff needed for ambulation/transfer         Medication Interventions: Patient to call before getting OOB,Teach patient to arise slowly    Elimination Interventions: Call light in reach    History of Falls Interventions: Consult care management for discharge planning         Problem: Patient Education: Go to Patient Education Activity  Goal: Patient/Family Education  Outcome: Progressing Towards Goal     Problem: Patient Education: Go to Patient Education Activity  Goal: Patient/Family Education  Outcome: Progressing Towards Goal

## 2022-02-23 NOTE — PROGRESS NOTES
End of Shift Note    Bedside shift change report given to Patrick Sauceda (oncoming nurse) by Asif Barrera (offgoing nurse). Report included the following information SBAR, Kardex, Intake/Output, MAR, Accordion, Recent Results and Med Rec Status    Shift worked:  night     Shift summary and any significant changes:    NPO after midnight     Concerns for physician to address:  none     Zone phone for oncoming shift:   [phone]       Activity:  Activity Level: Up with Assistance  Number times ambulated in hallways past shift: 0  Number of times OOB to chair past shift: 0    Cardiac:   Cardiac Monitoring: No           Access:   Current line(s): PIV     Genitourinary:   Urinary status: voiding    Respiratory:   O2 Device: None (Room air)  Chronic home O2 use?: NO  Incentive spirometer at bedside: NO     GI:  Last Bowel Movement Date: 02/20/22  Current diet:  DIET NPO Ice Chips, Sips of Water with Meds  Passing flatus: YES  Tolerating current diet: YES       Pain Management:   Patient states pain is manageable on current regimen: YES    Skin:  Salvador Score: 20  Interventions: float heels and increase time out of bed    Patient Safety:  Fall Score:  Total Score: 4  Interventions: assistive device (walker, cane, etc), gripper socks and pt to call before getting OOB  High Fall Risk: Yes    Length of Stay:  Expected LOS: - - -  Actual LOS: 2      Asif Barrera

## 2022-02-23 NOTE — PROGRESS NOTES
Hospitalist Progress Note    NAME: Suad Munson   :  1960   MRN:  [id number]       Assessment / Plan:  Acute L2 compression fracture POA  Secondary to ground-level fall    MRI L spine:  1. Acute moderate compression fracture of L2, without spinal canal compromise. Small disc extrusion versus small epidural hematoma directly superior to the  L2-3 disc, without central or foraminal stenosis. No suspicious features of the  compression fracture. No other fractures. 2. Relatively mild degenerative changes without central spinal stenosis. Neurosurgery recommended Lumbar brace  Given pain not controlled, going for Kyphoplasty by IR today  Pain control  PT/OT following  C/w Dexamethasone    Estimated discharge date:   Barriers:    Code status: Full  Prophylaxis: SCD's  Recommended Disposition:  PT, OT, RN     Subjective:     Chief Complaint / Reason for Physician Visit  Follow up for Lumbar fracture  She continues to have soreness in her back    Review of Systems:  Symptom Y/N Comments  Symptom Y/N Comments   Fever/Chills    Chest Pain     Poor Appetite    Edema     Cough    Abdominal Pain     Sputum    Joint Pain     SOB/HUSAIN    Pruritis/Rash     Nausea/vomit    Tolerating PT/OT     Diarrhea    Tolerating Diet     Constipation    Other       Could NOT obtain due to:      Objective:     VITALS:   Last 24hrs VS reviewed since prior progress note.  Most recent are:  Patient Vitals for the past 24 hrs:   Temp Pulse Resp BP SpO2   22 1305  68 18 126/65 97 %   22 1105 98.1 °F (36.7 °C) 62 18 (!) 128/56 98 %   22 0934    (!) 118/50    22 0830 98.4 °F (36.9 °C) 67 18 (!) 96/56 97 %   22 2323 98.5 °F (36.9 °C) 72 18 (!) 112/42 95 %   22 1612 98 °F (36.7 °C) 72 18 (!) 132/58 95 %     No intake or output data in the 24 hours ending 22 1346     I had a face to face encounter and independently examined this patient on 2022, as outlined below:  PHYSICAL EXAM:  General: WD, WN. Alert, cooperative, no acute distress    EENT:  EOMI. Anicteric sclerae. MMM  Resp:  CTA bilaterally, no wheezing or rales. No accessory muscle use  CV:  Regular  rhythm,  No edema  GI:  Soft, Non distended, Non tender. +Bowel sounds  Neurologic:  Alert and oriented X 3, normal speech,   Psych:   Good insight. Not anxious nor agitated  Skin:  No rashes. No jaundice    Reviewed most current lab test results and cultures  YES  Reviewed most current radiology test results   YES  Review and summation of old records today    NO  Reviewed patient's current orders and MAR    YES  PMH/SH reviewed - no change compared to H&P  ________________________________________________________________________  Care Plan discussed with:    Comments   Patient y    Family      RN y    Care Manager     Consultant                        Multidiciplinary team rounds were held today with , nursing, pharmacist and clinical coordinator. Patient's plan of care was discussed; medications were reviewed and discharge planning was addressed. ________________________________________________________________________  Total NON critical care TIME: 35   Minutes    Total CRITICAL CARE TIME Spent:   Minutes non procedure based      Comments   >50% of visit spent in counseling and coordination of care     ________________________________________________________________________  Melisa Bradshaw MD     Procedures: see electronic medical records for all procedures/Xrays and details which were not copied into this note but were reviewed prior to creation of Plan. LABS:  I reviewed today's most current labs and imaging studies.   Pertinent labs include:  Recent Labs     02/21/22 0443 02/20/22  1605   WBC 5.8 8.9   HGB 13.2 14.8   HCT 40.9 44.1   * 144*     Recent Labs     02/23/22  0408 02/22/22  0217 02/21/22  0443 02/20/22  1605   NA  --  139 140 142   K  --  3.7 3.8 3.9   CL  --  106 106 104   CO2  -- 28 29 28   GLU  --  95 98 110*   BUN  --  16 19 19   CREA  --  0.63 0.61 0.75   CA  --  8.3* 8.7 9.5   ALB  --   --  3.3*  --    TBILI  --   --  0.7  --    ALT  --   --  21  --    INR 1.0  --   --   --        Signed: Mary Clarke MD

## 2022-02-23 NOTE — PROGRESS NOTES
Problem: Pressure Injury - Risk of  Goal: *Prevention of pressure injury  Description: Document Salvador Scale and appropriate interventions in the flowsheet. Outcome: Progressing Towards Goal  Note: Pressure Injury Interventions:       Moisture Interventions: Apply protective barrier, creams and emollients,Maintain skin hydration (lotion/cream),Offer toileting Q_hr    Activity Interventions: Increase time out of bed    Mobility Interventions: Float heels    Nutrition Interventions: Document food/fluid/supplement intake                     Problem: Falls - Risk of  Goal: *Absence of Falls  Description: Document Leonardo Fall Risk and appropriate interventions in the flowsheet. Outcome: Progressing Towards Goal  Note: Fall Risk Interventions:  Mobility Interventions: Communicate number of staff needed for ambulation/transfer         Medication Interventions: Patient to call before getting OOB    Elimination Interventions: Patient to call for help with toileting needs    History of Falls Interventions:  Investigate reason for fall

## 2022-02-24 VITALS
OXYGEN SATURATION: 99 % | DIASTOLIC BLOOD PRESSURE: 61 MMHG | HEIGHT: 66 IN | TEMPERATURE: 97.9 F | SYSTOLIC BLOOD PRESSURE: 120 MMHG | HEART RATE: 68 BPM | RESPIRATION RATE: 16 BRPM | BODY MASS INDEX: 22.07 KG/M2 | WEIGHT: 137.3 LBS

## 2022-02-24 PROCEDURE — 74011250636 HC RX REV CODE- 250/636: Performed by: NURSE PRACTITIONER

## 2022-02-24 PROCEDURE — 97116 GAIT TRAINING THERAPY: CPT

## 2022-02-24 PROCEDURE — 97530 THERAPEUTIC ACTIVITIES: CPT

## 2022-02-24 PROCEDURE — 74011000250 HC RX REV CODE- 250: Performed by: NURSE PRACTITIONER

## 2022-02-24 PROCEDURE — G0378 HOSPITAL OBSERVATION PER HR: HCPCS

## 2022-02-24 PROCEDURE — 74011250637 HC RX REV CODE- 250/637: Performed by: NURSE PRACTITIONER

## 2022-02-24 RX ORDER — CYCLOBENZAPRINE HCL 10 MG
5 TABLET ORAL
Qty: 30 TABLET | Refills: 0 | Status: SHIPPED | OUTPATIENT
Start: 2022-02-24 | End: 2022-05-17 | Stop reason: ALTCHOICE

## 2022-02-24 RX ORDER — POLYETHYLENE GLYCOL 3350 17 G/17G
17 POWDER, FOR SOLUTION ORAL DAILY
Qty: 30 PACKET | Refills: 0 | Status: SHIPPED | OUTPATIENT
Start: 2022-02-24 | End: 2022-05-17 | Stop reason: ALTCHOICE

## 2022-02-24 RX ORDER — HYDROCODONE BITARTRATE AND ACETAMINOPHEN 5; 325 MG/1; MG/1
1 TABLET ORAL
Qty: 12 TABLET | Refills: 0 | Status: SHIPPED | OUTPATIENT
Start: 2022-02-24 | End: 2022-02-27

## 2022-02-24 RX ADMIN — POLYETHYLENE GLYCOL 3350 17 G: 17 POWDER, FOR SOLUTION ORAL at 09:47

## 2022-02-24 RX ADMIN — DOCUSATE SODIUM 50MG AND SENNOSIDES 8.6MG 1 TABLET: 8.6; 5 TABLET, FILM COATED ORAL at 09:48

## 2022-02-24 RX ADMIN — ACETAMINOPHEN 325MG 650 MG: 325 TABLET ORAL at 03:06

## 2022-02-24 RX ADMIN — ACETAMINOPHEN 325MG 650 MG: 325 TABLET ORAL at 09:48

## 2022-02-24 RX ADMIN — SODIUM CHLORIDE, PRESERVATIVE FREE 10 ML: 5 INJECTION INTRAVENOUS at 05:31

## 2022-02-24 RX ADMIN — DEXAMETHASONE 4 MG: 4 TABLET ORAL at 09:48

## 2022-02-24 RX ADMIN — HYDROCODONE BITARTRATE AND ACETAMINOPHEN 1 TABLET: 5; 325 TABLET ORAL at 12:49

## 2022-02-24 NOTE — PROGRESS NOTES
Transition of Care Plan:  RUR: 6% low   Disposition: home with home health- [facility]   Follow up appointments: ortho  PAUL needed: RW provided by [facility]   Transportation at Discharge: sister- around 12:30-1:00pm (lives 2 hours away)   [address] or means to access home: sister to provide   IM Medicare Letter: n/a commercial insurance   Is patient a BCPI-A Bundle: no              If yes, was Bundle Letter given?:    Is patient a [place] and connected with the [place]? no             If yes, was [employer] transfer form completed and [facility] notified? Caregiver Contact: sister Giuseppe Levy [phone]  Discharge Caregiver contacted prior to discharge? yes  Care Conference needed?: no       [facility] approved patient for RW. AVS updated. RW provided to patient at bedside. [facility]  has accepted patient for [facility] services with a soc date for tomorrow 2/25. Pt's sister to assist patient at home until brother and sister-in-law get into town later today.      Warren Andrew, [facility]

## 2022-02-24 NOTE — PROGRESS NOTES
End of Shift Note    Bedside shift change report given to Ez Fonseca RN (oncoming nurse) by Elva Serna LPN (offgoing nurse). Report included the following information SBAR, Kardex, Procedure Summary, Intake/Output, MAR and Recent Results    Shift worked:  night     Shift summary and any significant changes:          Concerns for physician to address:       Zone phone for oncoming shift:   [phone]       Activity:  Activity Level: Up with Assistance  Number times ambulated in hallways past shift: 0  Number of times OOB to chair past shift: 0    Cardiac:   Cardiac Monitoring: No      Cardiac Rhythm: Sinus Rhythm    Access:   Current line(s): PIV     Genitourinary:   Urinary status: voiding    Respiratory:   O2 Device: None (Room air)  Chronic home O2 use?: NO  Incentive spirometer at bedside: YES       GI:  Last Bowel Movement Date: 02/20/22  Current diet:  ADULT DIET Regular  Passing flatus: YES  Tolerating current diet: YES       Pain Management:   Patient states pain is manageable on current regimen: YES    Skin:  Salvador Score: 20  Interventions: increase time out of bed, PT/OT consult and nutritional support     Patient Safety:  Fall Score:  Total Score: 4  Interventions: bed/chair alarm, assistive device (walker, cane, etc), gripper socks, pt to call before getting OOB and stay with me (per policy)  High Fall Risk: Yes    Length of Stay:  Expected LOS: - - -  Actual LOS: 2      Elva Serna LPN

## 2022-02-24 NOTE — PROGRESS NOTES
Dayana Blackwell is a 59 y.o. female who presents today for hospital follow-up  Chief Complaint   Patient presents with   [facility] Follow Up           Assessment/ Plan:         ICD-10-CM ICD-9-CM    1. Closed compression fracture of lumbosacral spine, initial encounter ([facility] [address]  S32.000A 805.4 REFERRAL TO PHYSICAL THERAPY   2. Situational stress  F43.9 V62.89 ALPRAZolam (XANAX) 0.25 mg tablet   3. Constipation, unspecified constipation type  K59.00 564.00      Recommend colace for constipation due to opioid use. She will follow-up in the office in 2 months after completing PT. Patient agrees with plan of care. Orders Placed This Encounter    REFERRAL TO PHYSICAL THERAPY     Referral Priority:   Routine     Referral Type:   PT/OT/ST     Referral Reason:   Specialty Services Required     Number of Visits Requested:   1    ALPRAZolam (XANAX) 0.25 mg tablet     Sig: Take 2 Tablets by mouth nightly as needed for Anxiety. Max Daily Amount: 0.5 mg.     Dispense:  10 Tablet     Refill:  0       Follow-up and Dispositions    · Return in about 2 months (around 5/15/2022). Subjective:  Closed compression fracture of lumbosacral spine  Patient with recent fall on 2/20/22. Per pt she was at home hanging pictures on wall above her head when she lost balance. She fell backwards on her bed and then down on the floor landing on her butt. Had severe pain in her mid back->treated at [facility] and transferred to [id number] [place] [address] for further management. [address] [place] [address] Neurosurgery recommended a back brace for patient. Seen by physical therapy- able to walk with a walker. Kyphoplasty completed on 2/23->D/C'd home on 2/24 with flexeril and norco.    Today, she reports feeling much better. Has good sensation throughout her bilateral lower extremities. Denies bowel/bladder incontinence. Denies neurological complaints. Back feels 'sore', but ROM has improved and gait is steady. [facility] is coming to her house tomorrow for the last visit.  She would like to continue physical therapy outside of her home. Will refer patient to physical therapy. Of note, she still feels constipated. Recommend colace. Stress and anxiety  She recently moved to [facility] from [place]. Retired from the state of [place]. Reports her  passed away in 2019 and she was prescribed xanax 0.5mg as needed, she takes xanax about twice a month. She would like a refill. Will provide #10 xanax    Patient Active Problem List   Diagnosis Code    Closed compression fracture of lumbosacral spine ([facility] [address] S32.000A    Intractable back pain M54.9       Past Medical History:   Diagnosis Date    Anxiety     Hypertension          Current Outpatient Medications:     ALPRAZolam (XANAX) 0.25 mg tablet, Take 2 Tablets by mouth nightly as needed for Anxiety. Max Daily Amount: 0.5 mg., Disp: 10 Tablet, Rfl: 0    cyclobenzaprine (FLEXERIL) 10 mg tablet, Take 0.5 Tablets by mouth three (3) times daily as needed for Muscle Spasm(s). , Disp: 30 Tablet, Rfl: 0    polyethylene glycol (Miralax) 17 gram packet, Take 1 Packet by mouth daily. , Disp: 30 Packet, Rfl: 0    cannabidiol, CBD, (CANNABIDIOL PO), Take  by mouth., Disp: , Rfl:     cyanocobalamin (VITAMIN B12) 100 mcg tablet, Vitamin B12, Disp: , Rfl:     calcium carbonate (CALCIUM 500 PO), Take 500 mg by mouth., Disp: , Rfl:     Allergies   Allergen Reactions    Sulfa (Sulfonamide Antibiotics) Rash       Past Surgical History:   Procedure Laterality Date    HX CYST REMOVAL  1972    IR KYPHOPLASTY LUMBAR  2/23/2022       Social History     Tobacco Use   Smoking Status Never Smoker   Smokeless Tobacco Never Used       Social History     Socioeconomic History    Marital status:    Tobacco Use    Smoking status: Never Smoker    Smokeless tobacco: Never Used   Substance and Sexual Activity    Alcohol use:  Yes     Alcohol/week: 3.0 standard drinks     Types: 3 Cans of beer per week    Drug use: Never    Sexual activity: Not Currently       Family History   Problem Relation Age of Onset    Diabetes Mother     Stroke Mother     Cancer Father     Heart Disease Father     Hypertension Father     Diabetes Sister     Hypertension Sister     Heart Disease Brother     Hypertension Brother        ROS:  Review of Systems   Constitutional: Negative for chills, fever and weight loss. HENT: Negative for ear pain, hearing loss and tinnitus. Eyes: Negative for blurred vision and double vision. Respiratory: Negative for cough and hemoptysis. Cardiovascular: Negative for chest pain and palpitations. Gastrointestinal: Negative for heartburn, nausea and vomiting. Genitourinary: Negative for dysuria and urgency. Musculoskeletal: Positive for back pain. Negative for myalgias. Skin: Negative for itching and rash. Neurological: Negative for dizziness. Psychiatric/Behavioral: Negative for depression. The patient is nervous/anxious (trouble sleeping). Objective:     Visit Vitals  /71 (BP 1 Location: Left upper arm)   Pulse 92   Temp 99.1 °F (37.3 °C) (Temporal)   Resp 18   Ht 5' 6\" (1.676 m)   Wt 134 lb 8 oz (61 kg)   SpO2 97%   BMI 21.71 kg/m²     Body mass index is 21.71 kg/m². Physical Exam  Vitals reviewed. Constitutional:       Appearance: Normal appearance. HENT:      Head: Normocephalic. Right Ear: External ear normal.      Left Ear: External ear normal.      Nose: Nose normal.      Mouth/Throat:      Mouth: Mucous membranes are moist.   Eyes:      Extraocular Movements: Extraocular movements intact. Pupils: Pupils are equal, round, and reactive to light. Cardiovascular:      Rate and Rhythm: Normal rate and regular rhythm. Pulses: Normal pulses. Heart sounds: Normal heart sounds. Pulmonary:      Effort: Pulmonary effort is normal.      Breath sounds: Normal breath sounds. Abdominal:      General: Abdomen is flat.    Musculoskeletal:      Cervical back: Normal range of motion. Comments: She has a back brace on at this time. Back feels 'sore'  Steady gait   Skin:     General: Skin is warm. Neurological:      Mental Status: She is alert and oriented to person, place, and time. Psychiatric:         Mood and Affect: Mood normal.               No results found for this visit on 03/15/22. Verbal and written instructions (see AVS) provided. Patient expresses understanding of diagnosis and treatment plan. Follow-up and Dispositions    · Return in about 2 months (around 5/15/2022). Patient has been advised to contact practice or seek care if condition persists or worsens.      Juan Alberto Lackey, NP

## 2022-02-24 NOTE — PROGRESS NOTES
DISCHARGE NOTE FROM [facility]    Patient determined to be stable for discharge by attending provider. I have reviewed the discharge instructions with the patient. They verbalized understanding and all questions were answered to their satisfaction. No complaints or further questions were expressed. Medications sent to pharmacy. Appropriate educational materials and medication side effect teaching were provided. PIV were removed prior to discharge. Patient did not discharge with any line, morin, or drain. Personal items and valuables accounted for at discharge by patient and/or family: YES    Post-op patient: Yes- Patient given post-op discharge kit and instructed on use.        Tin Quiñonez RN

## 2022-02-24 NOTE — PROGRESS NOTES
Problem: Mobility Impaired (Adult and Pediatric)  Goal: *Acute Goals and Plan of Care (Insert Text)  Description: FUNCTIONAL STATUS PRIOR TO ADMISSION: Patient was independent and active without use of DME.    HOME SUPPORT PRIOR TO ADMISSION: The patient lived alone with sister local to provide assistance(did not require assistance PTA). Physical Therapy Goals  Initiated 2/22/2022    1. Patient will move from supine to sit and sit to supine , scoot up and down, and roll side to side in bed with independence within 4 days. 2. Patient will perform sit to stand with independence within 4 days. 3. Patient will ambulate with independence for 300 feet with the least restrictive device within 4 days. 4. Patient will ascend/descend 5 stairs with 1 handrail(s) with supervision/set-up within 4 days. 5. Patient will verbalize and demonstrate understanding of spinal precautions (No bending, lifting greater than 5 lbs, or twisting; log-roll technique; frequent repositioning as instructed) within 4 days. Outcome: Progressing Towards Goal   PHYSICAL THERAPY TREATMENT/DISCHARGE  Patient: Gala Webb (12 y.o. female)  Date: 2/24/2022  Diagnosis: Closed compression fracture of lumbosacral spine ([facility] [address] [S32.000A]  Intractable back pain [M54.9] <principal problem not specified>       Precautions: Back  Chart, physical therapy assessment, plan of care and goals were reviewed. ASSESSMENT  Patient continues with skilled PT services and has progressed towards goals. Patient reports significant improvement in pain following an L2 kyphoplasty 2/23/22. She was received in the bathroom with nursing and eager to mobilize. Reviewed brace application to improve fit and comfort with patient demonstrating understanding. Gait training completed x250' total starting with a RW and weaning to complete the 2nd 125' independently without DME. Gait with a slow marlin but good safety and no change in stated pain with or without DME. Stair training completed over 4 stairs with 1 rail modified independently without difficulty. The patient is progressing well towards goals but lives alone. She has family that will provide support for the first week post discharge. Recommend HHPT to restore functional independence. Other factors to consider for discharge: lives alone         PLAN :  Patient will be discharged from acute skilled physical therapy at this time. Rationale for discharge:  Goals achieved in preparation for discharge    Recommendation for discharge: (in order for the patient to meet his/her long term goals)  Physical therapy at least 2 days/week in the home     This discharge recommendation:  Has been made in collaboration with the attending provider and/or case management    IF patient discharges home will need the following DME: none       SUBJECTIVE:   Patient stated I wouldn't call it pain at this point. It is more soreness.     OBJECTIVE DATA SUMMARY:   Critical Behavior:  Neurologic State: Alert,Appropriate for age  Orientation Level: Oriented X4  Cognition: Follows commands  Safety/Judgement: Awareness of environment,Fall prevention,Home safety  Functional Mobility Training:  Bed Mobility:                    Transfers:  Sit to Stand: Stand-by assistance  Stand to Sit: Contact guard assistance                             Balance:  Sitting: Intact  Standing: Intact  Ambulation/Gait Training:  Distance (ft): 250 Feet (ft)  Assistive Device: Gait belt;Walker, rolling;Brace/Splint  Ambulation - Level of Assistance: Stand-by assistance        Gait Abnormalities: Decreased step clearance, altered arm swing        Base of Support: Narrowed     Speed/Jazz: Pace decreased (<100 feet/min)  Step Length: Right shortened;Left shortened                    Stairs:  Number of Stairs Trained: 4  Stairs - Level of Assistance: Modified independent   Rail Use: Left     Therapeutic Exercises:     Pain Rating:      Activity Tolerance: Good    After treatment patient left in no apparent distress:   Sitting in chair and Call bell within reach    COMMUNICATION/COLLABORATION:   The patients plan of care was discussed with: Registered nurse.      Matilda Felder PT, DPT   Time Calculation: 25 mins

## 2022-02-24 NOTE — DISCHARGE SUMMARY
Hospitalist Discharge Summary     Patient ID:  Dayana Blackwell  [id number]  15 y.o.  1960 2/21/2022    PCP on record: Nacho Claros NP    Admit date: 2/21/2022  Discharge date and time: 2/24/2022    DISCHARGE DIAGNOSIS:  Acute compression fracture POA secondary to fall  Back pain secondary to compression fracture  Patient has LSO brace  Anxiety constipation        CONSULTATIONS:  IP CONSULT TO NEUROSURGERY  IP CONSULT TO INTERVENTIONAL RADIOLOGY    Excerpted HPI from H&P of Jacinto Snell MD:  Dayana Blackwell is a 59 y.o.  female who presents with back pain s/p fall. She was initially treated at [facility] and was transferred here for further management. As per patient, she was hanging some pictures on the wall above the head of her bead when she lost he balance, feel backwards on her bed and down to the floor landing on her butt. She felt the severe pain right away on her lower mid back that has been constant since and is only eased up with pain medication. On assessment, reports 6-7 mid back pain. Denies fever, chills, cough, CP, SOB, HUSAIN, dysuria, hematuria, numbness and tingling sensation around her lower back and extremities and any new weakness and incontinence since the event. Past medical history is only significant for anxiet    ______________________________________________________________________  DISCHARGE SUMMARY/HOSPITAL COURSE:  for full details see H&P, daily progress notes, labs, consult notes. Patient is a 78-year-old who had a fall presented with back pain which was intractable patient was evaluated admitted to medical floor. Patient was placed in observation. Neurosurgery recommended brace patient patient seen by physical therapy able to walk with a walker comfortable no chest pain no shortness of breath. Patient will discharge out patient follow-up.   Patient also constipated recommended MiraLAX with opioids. _______________________________________________________________________  Patient seen and examined by me on discharge day. Pertinent Findings:  Gen:    Not in distress  Chest: Clear lungs  CVS:   Regular rhythm. No edema  Abd:  Soft, not distended, not tender  Neuro:  Alert,   _______________________________________________________________________  DISCHARGE MEDICATIONS:   Current Discharge Medication List      START taking these medications    Details   cyclobenzaprine (FLEXERIL) 10 mg tablet Take 0.5 Tablets by mouth three (3) times daily as needed for Muscle Spasm(s). Qty: 30 Tablet, Refills: 0  Start date: 2/24/2022      HYDROcodone-acetaminophen (NORCO) 5-325 mg per tablet Take 1 Tablet by mouth every four (4) hours as needed for Pain for up to 3 days. Max Daily Amount: 6 Tablets. Qty: 12 Tablet, Refills: 0  Start date: 2/24/2022, End date: 2/27/2022    Associated Diagnoses: Intractable back pain      polyethylene glycol (Miralax) 17 gram packet Take 1 Packet by mouth daily. Qty: 30 Packet, Refills: 0  Start date: 2/24/2022         CONTINUE these medications which have NOT CHANGED    Details   cannabidiol, CBD, (CANNABIDIOL PO) Take  by mouth. ALPRAZolam (XANAX) 0.25 mg tablet       cyanocobalamin (VITAMIN B12) 100 mcg tablet Vitamin B12      calcium carbonate (CALCIUM 500 PO) Take 500 mg by mouth. Patient Follow Up Instructions: Activity:  As tolerated  Diet: general  Wound Care: Follow-up with pcp  Follow-up tests/labs     Follow-up Information     Follow up With Specialties Details Why Contact Info    Carmela Booker NP Nurse Practitioner   [address] [facility]  [phone]      [place] Keys   this is your home health agency P.o.  [place]loe  [id number] [facility]   this is the agency that provided your walker [address] [facility] [address] ________________________________________________________________    Risk of deterioration: low    Condition at Discharge:  Stable  __________________________________________________________________    Disposition  Home with [facility]    ____________________________________________________________________    Code Status: full  ___________________________________________________________________      Total time in minutes spent coordinating this discharge (includes going over instructions, follow-up, prescriptions, and preparing report for sign off to her PCP) : 38 minutes    Signed:  Karl Stinson MD

## 2022-03-15 ENCOUNTER — OFFICE VISIT (OUTPATIENT)
Dept: FAMILY MEDICINE CLINIC | Age: 62
End: 2022-03-15
Payer: COMMERCIAL

## 2022-03-15 VITALS
OXYGEN SATURATION: 97 % | WEIGHT: 134.5 LBS | HEART RATE: 92 BPM | TEMPERATURE: 99.1 F | BODY MASS INDEX: 21.62 KG/M2 | RESPIRATION RATE: 18 BRPM | DIASTOLIC BLOOD PRESSURE: 71 MMHG | SYSTOLIC BLOOD PRESSURE: 116 MMHG | HEIGHT: 66 IN

## 2022-03-15 DIAGNOSIS — K59.00 CONSTIPATION, UNSPECIFIED CONSTIPATION TYPE: ICD-10-CM

## 2022-03-15 DIAGNOSIS — S32.000A CLOSED COMPRESSION FRACTURE OF LUMBOSACRAL SPINE, INITIAL ENCOUNTER (HCC): Primary | ICD-10-CM

## 2022-03-15 DIAGNOSIS — F43.9 SITUATIONAL STRESS: ICD-10-CM

## 2022-03-15 PROCEDURE — 99213 OFFICE O/P EST LOW 20 MIN: CPT | Performed by: NURSE PRACTITIONER

## 2022-03-15 RX ORDER — ALPRAZOLAM 0.25 MG/1
0.5 TABLET ORAL
Qty: 10 TABLET | Refills: 0 | Status: SHIPPED | OUTPATIENT
Start: 2022-03-15 | End: 2022-07-22 | Stop reason: SDUPTHER

## 2022-03-15 NOTE — PROGRESS NOTES
1. \"Have you been to the ER, urgent care clinic since your last visit? Hospitalized since your last visit? \" Yes When: ER 2-20-22     2. \"Have you seen or consulted any other health care providers outside of the [address] [place] [address] since your last visit? \" No     3. For patients aged 39-70: Has the patient had a colonoscopy / FIT/ Cologuard? Yes - no Care Gap present      If the patient is female:    4. For patients aged 41-77: Has the patient had a mammogram within the past 2 years? Yes - no Care Gap present      5. For patients aged 21-65: Has the patient had a pap smear?  No has apt scheduled

## 2022-03-16 ENCOUNTER — DOCUMENTATION ONLY (OUTPATIENT)
Dept: FAMILY MEDICINE CLINIC | Age: 62
End: 2022-03-16

## 2022-03-18 PROBLEM — S32.000A CLOSED COMPRESSION FRACTURE OF LUMBOSACRAL SPINE (HCC): Status: ACTIVE | Noted: 2022-02-21

## 2022-03-18 PROBLEM — M54.9 INTRACTABLE BACK PAIN: Status: ACTIVE | Noted: 2022-02-23

## 2022-03-25 ENCOUNTER — TELEPHONE (OUTPATIENT)
Dept: FAMILY MEDICINE CLINIC | Age: 62
End: 2022-03-25

## 2022-03-25 DIAGNOSIS — S32.000A CLOSED COMPRESSION FRACTURE OF LUMBOSACRAL SPINE, INITIAL ENCOUNTER (HCC): Primary | ICD-10-CM

## 2022-03-25 NOTE — TELEPHONE ENCOUNTER
Pt stated she would need a new referral for PT. [facility] will not be able to get her in until a few weeks and she is wanting a new referral for [facility] in [place].  They will be able to see pt next week but they need a referral  Phone number: [phone]

## 2022-03-29 ENCOUNTER — DOCUMENTATION ONLY (OUTPATIENT)
Dept: FAMILY MEDICINE CLINIC | Age: 62
End: 2022-03-29

## 2022-05-16 NOTE — PROGRESS NOTES
Katie Benjamin is a 59 y.o. female who presents today for the following  Chief Complaint   Patient presents with   [facility] PT. Assessment/ Plan:         ICD-10-CM ICD-9-CM    1. Closed compression fracture of lumbosacral spine, initial encounter ([facility] [address]  S32.000A 805.4 DEXA BONE DENSITY STUDY AXIAL   2. Situational stress  F43.9 V62.89      Continue with [facility] in [place]  F/U with Maryam Peeling as scheduled. Schedule DEXA scan--Will call with results once completed  Take OTC vitamin D and calcium daily  RTO in 6 months-sooner if needed    Orders Placed This Encounter    DEXA BONE DENSITY STUDY AXIAL     Standing Status:   Future     Standing Expiration Date:   6/17/2023       Follow-up and Dispositions    · Return in about 6 months (around 11/17/2022). Subjective:  Closed compression fracture of lumbosacral spine  Patient with recent fall on 2/20/22. Per pt she was at home hanging pictures on wall above her head when she lost balance. She fell backwards on her bed and then down on the floor landing on her butt. Had severe pain in her mid back->treated at [facility] and transferred to [facility] for further management. [facility] Neurosurgery recommended a back brace for patient. Seen by physical therapy- able to walk with a walker. Kyphoplasty completed on 2/23->D/C'd home on 2/24 with flexeril and norco. On chart review her kyphoplasty report stated --A fracture has occurred and the patient should be considered for osteoporosis treatment or testing-will order DEXA scan today. Today, she reports feeling much better. Has good sensation throughout her bilateral lower extremities. Denies bowel/bladder incontinence. Denies neurological complaints. Back feels 'sore', but ROM has improved and gait is steady. She has been going to [facility] in [address] [place] [address] for PT and laura well. Bowels are now back to normal. Has not needed flexeril or narcotics.  Recently traveled to [place] to visit family. Stress and anxiety  She recently moved to [place], [place] from [place]. Retired from the state of [place]. Reports her  passed away in 2019 and she was prescribed xanax 0.5mg as needed, she takes xanax about twice a month. She does need a refill today. She found a counselor in [employer] who she likes. She will continue to follow up with her as scheduled. Patient Active Problem List   Diagnosis Code    Closed compression fracture of lumbosacral spine ([facility] [address] S32.000A    Intractable back pain M54.9       Past Medical History:   Diagnosis Date    Anxiety     Hypertension          Current Outpatient Medications:     ALPRAZolam (XANAX) 0.25 mg tablet, Take 2 Tablets by mouth nightly as needed for Anxiety. Max Daily Amount: 0.5 mg., Disp: 10 Tablet, Rfl: 0    cannabidiol, CBD, (CANNABIDIOL PO), Take  by mouth., Disp: , Rfl:     cyanocobalamin (VITAMIN B12) 100 mcg tablet, Vitamin B12, Disp: , Rfl:     calcium carbonate (CALCIUM 500 PO), Take 500 mg by mouth., Disp: , Rfl:     Allergies   Allergen Reactions    Sulfa (Sulfonamide Antibiotics) Rash       Past Surgical History:   Procedure Laterality Date    HX CYST REMOVAL  1972    IR KYPHOPLASTY LUMBAR  2/23/2022       Social History     Tobacco Use   Smoking Status Never Smoker   Smokeless Tobacco Never Used       Social History     Socioeconomic History    Marital status:    Tobacco Use    Smoking status: Never Smoker    Smokeless tobacco: Never Used   Substance and Sexual Activity    Alcohol use:  Yes     Alcohol/week: 3.0 standard drinks     Types: 3 Cans of beer per week    Drug use: Never    Sexual activity: Not Currently       Family History   Problem Relation Age of Onset    Diabetes Mother     Stroke Mother     Cancer Father     Heart Disease Father     Hypertension Father     Diabetes Sister     Hypertension Sister     Heart Disease Brother     Hypertension Brother        ROS:  Review of Systems   Constitutional: Negative for chills, fever and weight loss. HENT: Negative for ear pain, hearing loss and tinnitus. Eyes: Negative for blurred vision and double vision. Respiratory: Negative for cough and hemoptysis. Cardiovascular: Negative for chest pain and palpitations. Gastrointestinal: Negative for heartburn, nausea and vomiting. Genitourinary: Negative for dysuria and urgency. Musculoskeletal: Positive for back pain. Negative for myalgias. Skin: Negative for itching and rash. Neurological: Negative for dizziness. Psychiatric/Behavioral: Negative for depression. The patient is nervous/anxious (stable). Objective:     Visit Vitals  /70 (BP 1 Location: Left arm, BP Patient Position: Sitting)   Pulse 67   Temp 98.8 °F (37.1 °C) (Temporal)   Resp 18   Ht 5' 6\" (1.676 m)   Wt 135 lb 2 oz (61.3 kg)   SpO2 98%   BMI 21.81 kg/m²     Body mass index is 21.81 kg/m². Physical Exam  Vitals reviewed. Constitutional:       Appearance: Normal appearance. HENT:      Head: Normocephalic. Right Ear: External ear normal.      Left Ear: External ear normal.      Nose: Nose normal.      Mouth/Throat:      Mouth: Mucous membranes are moist.   Eyes:      Extraocular Movements: Extraocular movements intact. Pupils: Pupils are equal, round, and reactive to light. Cardiovascular:      Rate and Rhythm: Normal rate and regular rhythm. Pulses: Normal pulses. Heart sounds: Normal heart sounds. Pulmonary:      Effort: Pulmonary effort is normal.      Breath sounds: Normal breath sounds. Abdominal:      General: Abdomen is flat. Musculoskeletal:      Cervical back: Normal range of motion. Comments: She has a back brace on at this time. Back feels 'sore'  Steady gait   Skin:     General: Skin is warm. Neurological:      Mental Status: She is alert and oriented to person, place, and time.    Psychiatric:         Mood and Affect: Mood normal. No results found for this visit on 05/17/22. Verbal and written instructions (see AVS) provided. Patient expresses understanding of diagnosis and treatment plan. Follow-up and Dispositions    · Return in about 6 months (around 11/17/2022). Patient has been advised to contact practice or seek care if condition persists or worsens.      Yevgeniy Sandoval NP

## 2022-05-17 ENCOUNTER — OFFICE VISIT (OUTPATIENT)
Dept: FAMILY MEDICINE CLINIC | Age: 62
End: 2022-05-17
Payer: COMMERCIAL

## 2022-05-17 VITALS
TEMPERATURE: 98.8 F | BODY MASS INDEX: 21.72 KG/M2 | HEART RATE: 67 BPM | RESPIRATION RATE: 18 BRPM | OXYGEN SATURATION: 98 % | WEIGHT: 135.13 LBS | DIASTOLIC BLOOD PRESSURE: 70 MMHG | SYSTOLIC BLOOD PRESSURE: 109 MMHG | HEIGHT: 66 IN

## 2022-05-17 DIAGNOSIS — F43.9 SITUATIONAL STRESS: ICD-10-CM

## 2022-05-17 DIAGNOSIS — S32.000A CLOSED COMPRESSION FRACTURE OF LUMBOSACRAL SPINE, INITIAL ENCOUNTER (HCC): Primary | ICD-10-CM

## 2022-05-17 PROCEDURE — 99213 OFFICE O/P EST LOW 20 MIN: CPT | Performed by: NURSE PRACTITIONER

## 2022-05-17 NOTE — PROGRESS NOTES
1. \"Have you been to the ER, urgent care clinic since your last visit? Hospitalized since your last visit? \" No    2. \"Have you seen or consulted any other health care providers outside of the [address] [place] [address] since your last visit? \" Tha Roe. , PT abilities Abound    3. For patients aged 39-70: Has the patient had a colonoscopy / FIT/ Cologuard? Yes - no Care Gap present      If the patient is female:    4. For patients aged 41-77: Has the patient had a mammogram within the past 2 years? Yes - no Care Gap present      5. For patients aged 21-65: Has the patient had a pap smear? Yes - Care Gap present. Most recent result on file  Has appt with Kushal Watkins NP in June. Chief Complaint   Patient presents with   [address] [place] [address]     doing PT.      Visit Vitals  /70 (BP 1 Location: Left arm, BP Patient Position: Sitting)   Pulse 67   Temp 98.8 °F (37.1 °C) (Temporal)   Resp 18   Ht 5' 6\" (1.676 m)   Wt 135 lb 2 oz (61.3 kg)   SpO2 98%   BMI 21.81 kg/m²

## 2022-06-06 ENCOUNTER — HOSPITAL ENCOUNTER (OUTPATIENT)
Dept: GENERAL RADIOLOGY | Age: 62
Discharge: HOME | End: 2022-06-06
Attending: NURSE PRACTITIONER
Payer: COMMERCIAL

## 2022-06-06 DIAGNOSIS — S32.000A CLOSED COMPRESSION FRACTURE OF LUMBOSACRAL SPINE, INITIAL ENCOUNTER (HCC): ICD-10-CM

## 2022-06-06 PROCEDURE — 77080 DXA BONE DENSITY AXIAL: CPT

## 2022-06-07 DIAGNOSIS — M81.8 OTHER OSTEOPOROSIS WITHOUT CURRENT PATHOLOGICAL FRACTURE: Primary | ICD-10-CM

## 2022-06-16 ENCOUNTER — HOSPITAL ENCOUNTER (OUTPATIENT)
Dept: INFUSION THERAPY | Age: 62
Discharge: HOME | End: 2022-06-16
Payer: COMMERCIAL

## 2022-06-16 VITALS
WEIGHT: 134 LBS | OXYGEN SATURATION: 100 % | TEMPERATURE: 97.7 F | SYSTOLIC BLOOD PRESSURE: 123 MMHG | DIASTOLIC BLOOD PRESSURE: 59 MMHG | HEART RATE: 58 BPM | RESPIRATION RATE: 18 BRPM | BODY MASS INDEX: 21.63 KG/M2

## 2022-06-16 LAB
ALBUMIN SERPL-MCNC: 3.8 G/DL (ref 3.5–5)
ANION GAP SERPL CALC-SCNC: 7 MMOL/L (ref 5–15)
BUN SERPL-MCNC: 17 MG/DL (ref 6–20)
BUN/CREAT SERPL: 22 (ref 12–20)
CALCIUM SERPL-MCNC: 9.3 MG/DL (ref 8.5–10.1)
CHLORIDE SERPL-SCNC: 106 MMOL/L (ref 97–108)
CO2 SERPL-SCNC: 32 MMOL/L (ref 21–32)
CREAT SERPL-MCNC: 0.78 MG/DL (ref 0.55–1.02)
GLUCOSE SERPL-MCNC: 81 MG/DL (ref 65–100)
MAGNESIUM SERPL-MCNC: 2.2 MG/DL (ref 1.6–2.4)
PHOSPHATE SERPL-MCNC: 4 MG/DL (ref 2.6–4.7)
PHOSPHATE SERPL-MCNC: 4 MG/DL (ref 2.6–4.7)
POTASSIUM SERPL-SCNC: 4.6 MMOL/L (ref 3.5–5.1)
SODIUM SERPL-SCNC: 145 MMOL/L (ref 136–145)

## 2022-06-16 PROCEDURE — 80069 RENAL FUNCTION PANEL: CPT

## 2022-06-16 PROCEDURE — 83735 ASSAY OF MAGNESIUM: CPT

## 2022-06-16 PROCEDURE — 74011250636 HC RX REV CODE- 250/636: Performed by: NURSE PRACTITIONER

## 2022-06-16 PROCEDURE — 84100 ASSAY OF PHOSPHORUS: CPT

## 2022-06-16 PROCEDURE — 36415 COLL VENOUS BLD VENIPUNCTURE: CPT

## 2022-06-16 PROCEDURE — 96372 THER/PROPH/DIAG INJ SC/IM: CPT

## 2022-06-16 RX ORDER — GLUCOSAMINE SULFATE 1500 MG
1000 POWDER IN PACKET (EA) ORAL DAILY
COMMUNITY

## 2022-06-16 RX ADMIN — DENOSUMAB 60 MG: 60 INJECTION SUBCUTANEOUS at 14:09

## 2022-06-16 NOTE — PROGRESS NOTES
[facility]IC Progress Note    Date: 2022    Name: Latasha Jessica    MRN: [id number]         : 1960      Ms. Fish was assessed and education was provided. Pt denies any new concerns. She does report some discomfort in her back from a fall she suffered in 2022. She underwent surgery and was just recently released from PT. Ms. Spenser Rodriguez vitals were reviewed and patient was observed for 5 minutes prior to treatment. Visit Vitals  BP (!) 123/59 (BP 1 Location: Left arm, BP Patient Position: Sitting)   Pulse (!) 58   Temp 97.7 °F (36.5 °C)   Resp 18   Wt 60.8 kg (134 lb)   SpO2 100%   Breastfeeding No   BMI 21.63 kg/m²       Lab results were obtained and reviewed. Recent Results (from the past 12 hour(s))   RENAL FUNCTION PANEL    Collection Time: 22 11:12 AM   Result Value Ref Range    Sodium 145 136 - 145 mmol/L    Potassium 4.6 3.5 - 5.1 mmol/L    Chloride 106 97 - 108 mmol/L    CO2 32 21 - 32 mmol/L    Anion gap 7 5 - 15 mmol/L    Glucose 81 65 - 100 mg/dL    BUN 17 6 - 20 MG/DL    Creatinine 0.78 0.55 - 1.02 MG/DL    BUN/Creatinine ratio 22 (H) 12 - 20      GFR est AA >60 >60 ml/min/1.73m2    GFR est non-AA >60 >60 ml/min/1.73m2    Calcium 9.3 8.5 - 10.1 MG/DL    Phosphorus 4.0 2.6 - 4.7 MG/DL    Albumin 3.8 3.5 - 5.0 g/dL   MAGNESIUM    Collection Time: 22 11:12 AM   Result Value Ref Range    Magnesium 2.2 1.6 - 2.4 mg/dL   PHOSPHORUS    Collection Time: 22 11:12 AM   Result Value Ref Range    Phosphorus 4.0 2.6 - 4.7 MG/DL     Prolia 60 mg was administered subcutaneous in right arm. Band-aid applied to site without redness, swelling or pain. Ms. Tere Elkins tolerated well, and had no complaints. Patient armband removed and shredded. Ms. Tere Elkins was discharged from [place] [address] in stable condition at [phone]. She is to return on December 15 at 1100 for her next appointment.     Kit Mejia RN  2022  3:12 PM

## 2022-07-22 DIAGNOSIS — F43.9 SITUATIONAL STRESS: ICD-10-CM

## 2022-08-09 ENCOUNTER — PATIENT MESSAGE (OUTPATIENT)
Dept: FAMILY MEDICINE CLINIC | Age: 62
End: 2022-08-09

## 2022-08-09 RX ORDER — ALPRAZOLAM 0.25 MG/1
0.5 TABLET ORAL
Qty: 10 TABLET | Refills: 0 | Status: SHIPPED | OUTPATIENT
Start: 2022-08-09

## 2022-11-10 NOTE — PROGRESS NOTES
Dory Johansen is a 59 y.o. female who presents today for the following  Chief Complaint   Patient presents with    Follow-up     6 months    Fatigue       Assessment/ Plan:         ICD-10-CM ICD-9-CM    1. Other fatigue  R53.83 780.79 CBC WITH AUTOMATED DIFF      METABOLIC PANEL, COMPREHENSIVE      TSH 3RD GENERATION      TSH 3RD GENERATION      METABOLIC PANEL, COMPREHENSIVE      CBC WITH AUTOMATED DIFF      2. Vitamin D deficiency  E55.9 268.9 VITAMIN D, 25 HYDROXY      VITAMIN D, 25 HYDROXY      3. Closed compression fracture of lumbosacral spine, [facility] ([facility]  S32.000A 805.4       4. Situational stress  F43.9 V62.89         Check labs today--will send my chart message once resulted  RTO in 3 months to discuss more lab testing if needed    Orders Placed This Encounter    CBC WITH AUTOMATED DIFF     Standing Status:   Future     Number of Occurrences:   1     Standing Expiration Date:   57/27/2284    METABOLIC PANEL, COMPREHENSIVE     Standing Status:   Future     Number of Occurrences:   1     Standing Expiration Date:   11/18/2023    TSH 3RD GENERATION     Standing Status:   Future     Number of Occurrences:   1     Standing Expiration Date:   11/18/2023    VITAMIN D, 25 HYDROXY     Standing Status:   Future     Number of Occurrences:   1     Standing Expiration Date:   11/18/2023       Follow-up and Dispositions    Return in about 3 months (around 2/18/2023). Subjective:  Elizabeth Barahona is here today for follow-up. Her only complaint today is   New issue:  Fatigue:    Feels like she could sleep 'all the time'. Still working out and performing daily activities but feels the unrelenting fatigue on most days. No history of thyroid disease or anemia. States she has been eating well. She denies weight loss or weight gain. We will check basic labs along with a TSH today. She does have a h x of vitamin D deficiency and take vitamin D daily.      HPI:   Closed compression fracture of lumbosacral spine  Patient with recent fall on 2/20/22. Per pt she was at home hanging pictures on wall above her head when she lost balance. She fell backwards on her bed and then down on the floor landing on her butt. Had severe pain in her mid back->treated at [facility] and transferred to [facility] for further management. [facility] Neurosurgery recommended a back brace for patient. Seen by physical therapy- able to walk with a walker. Kyphoplasty completed on 2/23->D/C'd home on 2/24 with flexeril and norco. On chart review her kyphoplasty report stated --A fracture has occurred and the patient should be considered for osteoporosis treatment or testing-will order DEXA scan today. Today, she reports feeling much better. Has good sensation throughout her bilateral lower extremities. Denies bowel/bladder incontinence. Denies neurological complaints. She is ambulatory with full ROM. Stress and anxiety  She recently moved to [address] from [place]. Retired from the state of [place]. Reports her  passed away in 2019 and she was prescribed xanax 0.5mg as needed, she takes xanax about twice a month. She does not need a refill today. She found a counselor in [facility] who she has now completed her sessions with. She feels well. She does have trouble sleeping sometimes and will use the cannabinoid drops which work well. Patient Active Problem List   Diagnosis Code    Closed compression fracture of lumbosacral spine ([facility] S32.000A    Intractable back pain M54.9       Past Medical History:   Diagnosis Date    Anxiety     Hypertension          Current Outpatient Medications:     ALPRAZolam (XANAX) 0.25 mg tablet, Take 2 Tablets by mouth nightly as needed for Anxiety. Max Daily Amount: 0.5 mg., Disp: 10 Tablet, Rfl: 0    cholecalciferol (VITAMIN D3) 25 mcg (1,000 unit) cap, Take 1,000 Units by mouth daily. , Disp: , Rfl:     cannabidiol, CBD, (CANNABIDIOL PO), Take  by mouth., Disp: , Rfl: cyanocobalamin (VITAMIN B12) 100 mcg tablet, Vitamin B12, Disp: , Rfl:     calcium carbonate (CALCIUM 500 PO), Take 500 mg by mouth., Disp: , Rfl:     Allergies   Allergen Reactions    Sulfa (Sulfonamide Antibiotics) Rash       Past Surgical History:   Procedure Laterality Date    HX CYST REMOVAL  1972    IR KYPHOPLASTY LUMBAR  2/23/2022       Social History     Tobacco Use   Smoking Status Never   Smokeless Tobacco Never       Social History     Socioeconomic History    Marital status:    Tobacco Use    Smoking status: Never    Smokeless tobacco: Never   Substance and Sexual Activity    Alcohol use: Yes     Alcohol/week: 3.0 standard drinks     Types: 3 Cans of beer per week    Drug use: Never    Sexual activity: Not Currently       Family History   Problem Relation Age of Onset    Diabetes Mother     Stroke Mother     Cancer Father     Heart Disease Father     Hypertension Father     Diabetes Sister     Hypertension Sister     Heart Disease Brother     Hypertension Brother        ROS:  Review of Systems   Constitutional:  Positive for malaise/fatigue. Negative for chills, fever and weight loss. HENT:  Negative for ear pain, hearing loss and tinnitus. Eyes:  Negative for blurred vision and double vision. Respiratory:  Negative for cough and hemoptysis. Cardiovascular:  Negative for chest pain and palpitations. Gastrointestinal:  Negative for heartburn, nausea and vomiting. Genitourinary:  Negative for dysuria and urgency. Musculoskeletal:  Negative for myalgias. Skin:  Negative for itching and rash. Neurological:  Negative for dizziness. Psychiatric/Behavioral:  Negative for depression. The patient is nervous/anxious (stable).         Objective:     Visit Vitals  /81 (BP 1 Location: Left upper arm, BP Patient Position: Sitting, BP Cuff Size: Adult)   Pulse 66   Temp 97 °F (36.1 °C) (Temporal)   Resp 18   Ht 5' 6\" (1.676 m)   Wt 135 lb (61.2 kg)   SpO2 97%   BMI 21.79 kg/m²     Body mass index is 21.79 kg/m². Physical Exam  Vitals reviewed. Constitutional:       General: She is not in acute distress. Appearance: She is not ill-appearing or toxic-appearing. HENT:      Head: Normocephalic. Right Ear: External ear normal.      Left Ear: External ear normal.      Nose: Nose normal.      Mouth/Throat:      Mouth: Mucous membranes are moist.   Eyes:      Pupils: Pupils are equal, round, and reactive to light. Cardiovascular:      Rate and Rhythm: Normal rate. Pulses: Normal pulses. Pulmonary:      Effort: Pulmonary effort is normal.   Abdominal:      General: Abdomen is flat. Musculoskeletal:         General: Normal range of motion. Cervical back: Normal range of motion. Skin:     General: Skin is warm. Neurological:      Mental Status: She is alert and oriented to person, place, and time. Psychiatric:         Mood and Affect: Mood normal.         Behavior: Behavior normal.             No results found for this visit on 11/18/22. Verbal and written instructions (see AVS) provided. Patient expresses understanding of diagnosis and treatment plan. Follow-up and Dispositions    Return in about 3 months (around 2/18/2023). Patient has been advised to contact practice or seek care if condition persists or worsens.      Rochelle Gold NP

## 2022-11-18 ENCOUNTER — OFFICE VISIT (OUTPATIENT)
Dept: FAMILY MEDICINE CLINIC | Age: 62
End: 2022-11-18
Payer: COMMERCIAL

## 2022-11-18 VITALS
BODY MASS INDEX: 21.69 KG/M2 | HEIGHT: 66 IN | TEMPERATURE: 97 F | OXYGEN SATURATION: 97 % | WEIGHT: 135 LBS | SYSTOLIC BLOOD PRESSURE: 138 MMHG | HEART RATE: 66 BPM | RESPIRATION RATE: 18 BRPM | DIASTOLIC BLOOD PRESSURE: 81 MMHG

## 2022-11-18 DIAGNOSIS — E55.9 VITAMIN D DEFICIENCY: ICD-10-CM

## 2022-11-18 DIAGNOSIS — F43.9 SITUATIONAL STRESS: ICD-10-CM

## 2022-11-18 DIAGNOSIS — R53.83 OTHER FATIGUE: Primary | ICD-10-CM

## 2022-11-18 DIAGNOSIS — S32.000A CLOSED COMPRESSION FRACTURE OF LUMBOSACRAL SPINE, INITIAL ENCOUNTER (HCC): ICD-10-CM

## 2022-11-18 PROCEDURE — 99213 OFFICE O/P EST LOW 20 MIN: CPT | Performed by: NURSE PRACTITIONER

## 2022-11-18 NOTE — PROGRESS NOTES
1. \"Have you been to the ER, urgent care clinic since your last visit? Hospitalized since your last visit? \" No    2. \"Have you seen or consulted any other health care providers outside of the [address] [place] [address] since your last visit? \" No     3. For patients aged 39-70: Has the patient had a colonoscopy / FIT/ Cologuard? Yes - no Care Gap present      If the patient is female:    4. For patients aged 41-77: Has the patient had a mammogram within the past 2 years? Yes - no Care Gap present      5. For patients aged 21-65: Has the patient had a pap smear?  No

## 2022-11-19 LAB
25(OH)D3 SERPL-MCNC: 46.8 NG/ML (ref 30–100)
ALBUMIN SERPL-MCNC: 3.9 G/DL (ref 3.5–5)
ALBUMIN/GLOB SERPL: 1.6 {RATIO} (ref 1.1–2.2)
ALP SERPL-CCNC: 63 U/L (ref 45–117)
ALT SERPL-CCNC: 19 U/L (ref 12–78)
ANION GAP SERPL CALC-SCNC: 3 MMOL/L (ref 5–15)
AST SERPL-CCNC: 16 U/L (ref 15–37)
BASOPHILS # BLD: 0 K/UL (ref 0–0.1)
BASOPHILS NFR BLD: 1 % (ref 0–1)
BILIRUB SERPL-MCNC: 0.6 MG/DL (ref 0.2–1)
BUN SERPL-MCNC: 18 MG/DL (ref 6–20)
BUN/CREAT SERPL: 25 (ref 12–20)
CALCIUM SERPL-MCNC: 9.2 MG/DL (ref 8.5–10.1)
CHLORIDE SERPL-SCNC: 105 MMOL/L (ref 97–108)
CO2 SERPL-SCNC: 30 MMOL/L (ref 21–32)
CREAT SERPL-MCNC: 0.71 MG/DL (ref 0.55–1.02)
DIFFERENTIAL METHOD BLD: ABNORMAL
EOSINOPHIL # BLD: 0.1 K/UL (ref 0–0.4)
EOSINOPHIL NFR BLD: 3 % (ref 0–7)
ERYTHROCYTE [DISTWIDTH] IN BLOOD BY AUTOMATED COUNT: 12.3 % (ref 11.5–14.5)
GLOBULIN SER CALC-MCNC: 2.5 G/DL (ref 2–4)
GLUCOSE SERPL-MCNC: 94 MG/DL (ref 65–100)
HCT VFR BLD AUTO: 44 % (ref 35–47)
HGB BLD-MCNC: 14.4 G/DL (ref 11.5–16)
IMM GRANULOCYTES # BLD AUTO: 0 K/UL (ref 0–0.04)
IMM GRANULOCYTES NFR BLD AUTO: 0 % (ref 0–0.5)
LYMPHOCYTES # BLD: 1.5 K/UL (ref 0.8–3.5)
LYMPHOCYTES NFR BLD: 42 % (ref 12–49)
MCH RBC QN AUTO: 30.2 PG (ref 26–34)
MCHC RBC AUTO-ENTMCNC: 32.7 G/DL (ref 30–36.5)
MCV RBC AUTO: 92.2 FL (ref 80–99)
MONOCYTES # BLD: 0.3 K/UL (ref 0–1)
MONOCYTES NFR BLD: 7 % (ref 5–13)
NEUTS SEG # BLD: 1.7 K/UL (ref 1.8–8)
NEUTS SEG NFR BLD: 47 % (ref 32–75)
NRBC # BLD: 0 K/UL (ref 0–0.01)
NRBC BLD-RTO: 0 PER 100 WBC
PLATELET # BLD AUTO: 171 K/UL (ref 150–400)
PMV BLD AUTO: 13 FL (ref 8.9–12.9)
POTASSIUM SERPL-SCNC: 4.7 MMOL/L (ref 3.5–5.1)
PROT SERPL-MCNC: 6.4 G/DL (ref 6.4–8.2)
RBC # BLD AUTO: 4.77 M/UL (ref 3.8–5.2)
SODIUM SERPL-SCNC: 138 MMOL/L (ref 136–145)
TSH SERPL DL<=0.05 MIU/L-ACNC: 1.82 UIU/ML (ref 0.36–3.74)
WBC # BLD AUTO: 3.6 K/UL (ref 3.6–11)

## 2022-11-20 ENCOUNTER — PATIENT MESSAGE (OUTPATIENT)
Dept: FAMILY MEDICINE CLINIC | Age: 62
End: 2022-11-20

## 2022-11-20 NOTE — PROGRESS NOTES
Vitamin D is normal.  Thyroid function is normal.  No anemia of infection in the labs. Kidney, electrolytes and liver look good.     [email or web address] message sent

## 2022-11-29 ENCOUNTER — HOSPITAL ENCOUNTER (OUTPATIENT)
Dept: INFUSION THERAPY | Age: 62
Discharge: HOME | End: 2022-11-29
Payer: COMMERCIAL

## 2022-11-29 VITALS
SYSTOLIC BLOOD PRESSURE: 132 MMHG | HEART RATE: 88 BPM | HEIGHT: 66 IN | OXYGEN SATURATION: 97 % | BODY MASS INDEX: 22.18 KG/M2 | TEMPERATURE: 97.8 F | DIASTOLIC BLOOD PRESSURE: 61 MMHG | RESPIRATION RATE: 17 BRPM | WEIGHT: 138 LBS

## 2022-11-29 PROCEDURE — 96372 THER/PROPH/DIAG INJ SC/IM: CPT

## 2022-11-29 PROCEDURE — 74011250636 HC RX REV CODE- 250/636: Performed by: NURSE PRACTITIONER

## 2022-11-29 RX ADMIN — DENOSUMAB 60 MG: 60 INJECTION SUBCUTANEOUS at 10:42

## 2022-11-30 NOTE — ROUTINE PROCESS
[facility] OPIC Progress Note    Date: 2022    Name: Janna Gonzales    MRN: [id number]         : 1960      Ms. Fish was assessed and education was provided. Ms. David Dixon vitals were reviewed and patient was observed for 5 minutes prior to treatment. Visit Vitals  /61 (BP 1 Location: Left upper arm, BP Patient Position: Sitting)   Pulse 88   Temp 97.8 °F (36.6 °C)   Resp 17   Ht 5' 6\" (1.676 m)   Wt 62.6 kg (138 lb)   SpO2 97%   Breastfeeding No   BMI 22.27 kg/m²         Prolia 60 mg  was administered subcutaneous in  right arm . Ms. Fish tolerated well, and had no complaints. Patient armband removed and shredded. Ms. Rey Noble was discharged from [place] [address] in stable condition at 1045. She is to return on   at 1030 for her next appointment.     Bindu Miller RN  2022  1:36 PM

## 2022-12-15 ENCOUNTER — HOSPITAL ENCOUNTER (OUTPATIENT)
Dept: INFUSION THERAPY | Age: 62
Discharge: HOME | End: 2022-12-15

## 2023-02-09 NOTE — PROGRESS NOTES
Pal Mcclelland is a 57 y.o. female who presents today for the following  Chief Complaint   Patient presents with    Irregular Heart Beat       Assessment/ Plan:         ICD-10-CM ICD-9-CM    1. Fluttering heart  I49.8 427.42 AMB POC EKG ROUTINE W/ 12 LEADS, INTER & REP      TSH 3RD GENERATION      CBC WITH AUTOMATED DIFF      METABOLIC PANEL, COMPREHENSIVE      METABOLIC PANEL, COMPREHENSIVE      CBC WITH AUTOMATED DIFF      TSH 3RD GENERATION      RI COLLECTION VENOUS BLOOD VENIPUNCTURE      2. Dizziness  R42 780.4 TSH 3RD GENERATION      CBC WITH AUTOMATED DIFF      METABOLIC PANEL, COMPREHENSIVE      METABOLIC PANEL, COMPREHENSIVE      CBC WITH AUTOMATED DIFF      TSH 3RD GENERATION      3. Palpitations  R00.2 785.1 REFERRAL TO CARDIOLOGY      4. Situational stress  F43.9 V62.89 ALPRAZolam (XANAX) 0.25 mg tablet        EKG today-NSR 69    No previous EKG for comparison  Check labs today    Referral to cardiology for recommendations    Orthostatics completed in the office with no drop in her blood pressure on standing.     Refill provided on the xanax    RTO in 3 months--sooner if needed    Orders Placed This Encounter    TSH 3RD GENERATION     Standing Status:   Future     Number of Occurrences:   1     Standing Expiration Date:   2/16/2024    CBC WITH AUTOMATED DIFF     Standing Status:   Future     Number of Occurrences:   1     Standing Expiration Date:   3/63/3541    METABOLIC PANEL, COMPREHENSIVE     Standing Status:   Future     Number of Occurrences:   1     Standing Expiration Date:   2/16/2024    REFERRAL TO CARDIOLOGY     Referral Priority:   Routine     Referral Type:   Consultation     Referral Reason:   Specialty Services Required     Referred to Provider:   Neto Morales NP     Number of Visits Requested:   1    AMB POC EKG ROUTINE W/ 12 LEADS, INTER & REP     Order Specific Question:   Reason for Exam:     Answer:   FLUTTERING    RI COLLECTION VENOUS BLOOD VENIPUNCTURE    ALPRAZolam April Menjivar 0.25 mg tablet     Sig: Take 2 Tablets by mouth nightly as needed for Anxiety. Max Daily Amount: 0.5 mg.     Dispense:  10 Tablet     Refill:  0               Subjective:  Tyrone Jesus is here today for palpitations. She reports about a week ago feeling a \"fluttering in her chest at night when she lays down on the \". She denies chest pain. Denies feeling fatigued today. She drinks a glass of wine at night, but feels the fluttering much later. She denies changes in her diet or her caffeine intake. We checked labs for her a few months ago which were unremarkable. Her father had a cardiac bypass when he was in his 63's and her brother had a stent placed in his 63's so she is concerned about the fluttering feeling since \" I am now in my 61's. \" We will check TSH, CBC, CMP and an EKG today. I will refer her to cardiology for recommendations and possible holter monitor. Lab Results   Component Value Date/Time    TSH 1.82 11/18/2022 11:00 AM     Of note, she recently moved to [place], [place] from [place]. Retired from the state of [place]. Reports her  passed away in March 2019 and she was prescribed xanax 0.5mg as needed, she takes xanax about twice a month. This is the same time of year and she admits to feeling more tearful and stressed around this time of year. Patient Active Problem List   Diagnosis Code    Closed compression fracture of lumbosacral spine ([facility] S32.000A    Intractable back pain M54.9       Past Medical History:   Diagnosis Date    Anxiety     Hypertension          Current Outpatient Medications:     ALPRAZolam (XANAX) 0.25 mg tablet, Take 2 Tablets by mouth nightly as needed for Anxiety. Max Daily Amount: 0.5 mg., Disp: 10 Tablet, Rfl: 0    cholecalciferol (VITAMIN D3) 25 mcg (1,000 unit) cap, Take 1,000 Units by mouth daily. , Disp: , Rfl:     cyanocobalamin (VITAMIN B12) 100 mcg tablet, Vitamin B12, Disp: , Rfl:     calcium carbonate (CALCIUM 500 PO), Take 500 mg by mouth., Disp: , Rfl:     cannabidiol, CBD, (CANNABIDIOL PO), Take  by mouth. (Patient not taking: Reported on 2/16/2023), Disp: , Rfl:     Allergies   Allergen Reactions    Sulfa (Sulfonamide Antibiotics) Rash       Past Surgical History:   Procedure Laterality Date    HX CYST REMOVAL  1972    IR KYPHOPLASTY LUMBAR  2/23/2022       Social History     Tobacco Use   Smoking Status Never   Smokeless Tobacco Never       Social History     Socioeconomic History    Marital status:    Tobacco Use    Smoking status: Never    Smokeless tobacco: Never   Vaping Use    Vaping Use: Never used   Substance and Sexual Activity    Alcohol use: Yes     Alcohol/week: 3.0 standard drinks     Types: 3 Cans of beer per week    Drug use: Never    Sexual activity: Not Currently     Social Determinants of Health     Financial Resource Strain: Low Risk     Difficulty of Paying Living Expenses: Not hard at all   Food Insecurity: No Food Insecurity    Worried About Running Out of Food in the Last Year: Never true    Ran Out of Food in the Last Year: Never true       Family History   Problem Relation Age of Onset    Diabetes Mother     Stroke Mother     Cancer Father     Heart Disease Father     Hypertension Father     Diabetes Sister     Hypertension Sister     Heart Disease Brother     Hypertension Brother        ROS:  Review of Systems   Constitutional:  Negative for chills, fever and weight loss. HENT:  Negative for ear pain, hearing loss and tinnitus. Eyes:  Negative for blurred vision and double vision. Respiratory:  Negative for cough and hemoptysis. Cardiovascular:  Positive for palpitations. Negative for chest pain, claudication and leg swelling.        +fluttering   Gastrointestinal:  Negative for heartburn, nausea and vomiting. Genitourinary:  Negative for dysuria and urgency. Musculoskeletal:  Negative for myalgias. Skin:  Negative for itching and rash. Neurological:  Positive for dizziness. Psychiatric/Behavioral:  Negative for depression. The patient is nervous/anxious (stable). +tearful       Objective:     Visit Vitals  /68 (BP 1 Location: Left upper arm)   Pulse (!) 54   Temp 97.8 °F (36.6 °C) (Oral)   Resp 16   Ht 5' 6\" (1.676 m)   Wt 140 lb (63.5 kg)   SpO2 99%   BMI 22.60 kg/m²     Body mass index is 22.6 kg/m². Physical Exam  Vitals reviewed. Constitutional:       General: She is not in acute distress. Appearance: She is not ill-appearing or toxic-appearing. HENT:      Head: Normocephalic. Right Ear: External ear normal.      Left Ear: External ear normal.      Nose: Nose normal.      Mouth/Throat:      Mouth: Mucous membranes are moist.   Eyes:      Pupils: Pupils are equal, round, and reactive to light. Cardiovascular:      Rate and Rhythm: Bradycardia present. Pulses: Normal pulses. Comments: EKG-NSR  Pulmonary:      Effort: Pulmonary effort is normal.   Abdominal:      General: Abdomen is flat. Musculoskeletal:         General: Normal range of motion. Cervical back: Normal range of motion. Skin:     General: Skin is warm. Neurological:      Mental Status: She is alert and oriented to person, place, and time. Psychiatric:         Mood and Affect: Mood normal.         Behavior: Behavior normal.      Comments: Tearful when talking about her husbands death March 2019             No results found for this visit on 02/16/23. Verbal and written instructions (see AVS) provided. Patient expresses understanding of diagnosis and treatment plan. Patient has been advised to contact practice or seek care if condition persists or worsens.      Ryan Boyd NP

## 2023-02-16 ENCOUNTER — OFFICE VISIT (OUTPATIENT)
Dept: FAMILY MEDICINE CLINIC | Age: 63
End: 2023-02-16
Payer: COMMERCIAL

## 2023-02-16 VITALS
HEIGHT: 66 IN | DIASTOLIC BLOOD PRESSURE: 68 MMHG | TEMPERATURE: 97.8 F | BODY MASS INDEX: 22.5 KG/M2 | HEART RATE: 54 BPM | OXYGEN SATURATION: 99 % | WEIGHT: 140 LBS | SYSTOLIC BLOOD PRESSURE: 124 MMHG | RESPIRATION RATE: 16 BRPM

## 2023-02-16 DIAGNOSIS — R00.2 PALPITATIONS: ICD-10-CM

## 2023-02-16 DIAGNOSIS — I49.8 FLUTTERING HEART: Primary | ICD-10-CM

## 2023-02-16 DIAGNOSIS — F43.9 SITUATIONAL STRESS: ICD-10-CM

## 2023-02-16 DIAGNOSIS — R42 DIZZINESS: ICD-10-CM

## 2023-02-16 PROCEDURE — 99214 OFFICE O/P EST MOD 30 MIN: CPT | Performed by: NURSE PRACTITIONER

## 2023-02-16 PROCEDURE — 93000 ELECTROCARDIOGRAM COMPLETE: CPT | Performed by: NURSE PRACTITIONER

## 2023-02-16 PROCEDURE — 36415 COLL VENOUS BLD VENIPUNCTURE: CPT | Performed by: NURSE PRACTITIONER

## 2023-02-16 RX ORDER — ALPRAZOLAM 0.25 MG/1
0.5 TABLET ORAL
Qty: 10 TABLET | Refills: 0 | Status: SHIPPED | OUTPATIENT
Start: 2023-02-16

## 2023-02-16 NOTE — PROGRESS NOTES
Identified pt with two pt identifiers(name and ). Reviewed record in preparation for visit and have obtained necessary documentation. Chief Complaint   Patient presents with    Irregular Heart Beat       1. \"Have you been to the ER, urgent care clinic since your last visit? Hospitalized since your last visit? \" No    2. \"Have you seen or consulted any other health care providers outside of the [address] [place] [address] since your last visit? \" No     3. For patients aged 39-70: Has the patient had a colonoscopy / FIT/ Cologuard? Yes - no Care Gap present      If the patient is female:    4. For patients aged 41-77: Has the patient had a mammogram within the past 2 years? No, scheduled        5. For patients aged 21-65: Has the patient had a pap smear?  No, based on age

## 2023-02-17 LAB
ALBUMIN SERPL-MCNC: 4 G/DL (ref 3.5–5)
ALBUMIN/GLOB SERPL: 1.6 (ref 1.1–2.2)
ALP SERPL-CCNC: 62 U/L (ref 45–117)
ALT SERPL-CCNC: 22 U/L (ref 12–78)
ANION GAP SERPL CALC-SCNC: 5 MMOL/L (ref 5–15)
AST SERPL-CCNC: 19 U/L (ref 15–37)
BASOPHILS # BLD: 0 K/UL (ref 0–0.1)
BASOPHILS NFR BLD: 1 % (ref 0–1)
BILIRUB SERPL-MCNC: 0.5 MG/DL (ref 0.2–1)
BUN SERPL-MCNC: 17 MG/DL (ref 6–20)
BUN/CREAT SERPL: 24 (ref 12–20)
CALCIUM SERPL-MCNC: 9.6 MG/DL (ref 8.5–10.1)
CHLORIDE SERPL-SCNC: 106 MMOL/L (ref 97–108)
CO2 SERPL-SCNC: 31 MMOL/L (ref 21–32)
CREAT SERPL-MCNC: 0.7 MG/DL (ref 0.55–1.02)
DIFFERENTIAL METHOD BLD: NORMAL
EOSINOPHIL # BLD: 0.1 K/UL (ref 0–0.4)
EOSINOPHIL NFR BLD: 2 % (ref 0–7)
ERYTHROCYTE [DISTWIDTH] IN BLOOD BY AUTOMATED COUNT: 13.1 % (ref 11.5–14.5)
GLOBULIN SER CALC-MCNC: 2.5 G/DL (ref 2–4)
GLUCOSE SERPL-MCNC: 87 MG/DL (ref 65–100)
HCT VFR BLD AUTO: 45.8 % (ref 35–47)
HGB BLD-MCNC: 14.7 G/DL (ref 11.5–16)
IMM GRANULOCYTES # BLD AUTO: 0 K/UL (ref 0–0.04)
IMM GRANULOCYTES NFR BLD AUTO: 0 % (ref 0–0.5)
LYMPHOCYTES # BLD: 1.5 K/UL (ref 0.8–3.5)
LYMPHOCYTES NFR BLD: 41 % (ref 12–49)
MCH RBC QN AUTO: 29.9 PG (ref 26–34)
MCHC RBC AUTO-ENTMCNC: 32.1 G/DL (ref 30–36.5)
MCV RBC AUTO: 93.3 FL (ref 80–99)
MONOCYTES # BLD: 0.3 K/UL (ref 0–1)
MONOCYTES NFR BLD: 8 % (ref 5–13)
NEUTS SEG # BLD: 1.8 K/UL (ref 1.8–8)
NEUTS SEG NFR BLD: 48 % (ref 32–75)
NRBC # BLD: 0 K/UL (ref 0–0.01)
NRBC BLD-RTO: 0 PER 100 WBC
PLATELET # BLD AUTO: 166 K/UL (ref 150–400)
PMV BLD AUTO: 12.8 FL (ref 8.9–12.9)
POTASSIUM SERPL-SCNC: 5.1 MMOL/L (ref 3.5–5.1)
PROT SERPL-MCNC: 6.5 G/DL (ref 6.4–8.2)
RBC # BLD AUTO: 4.91 M/UL (ref 3.8–5.2)
SODIUM SERPL-SCNC: 142 MMOL/L (ref 136–145)
TSH SERPL DL<=0.05 MIU/L-ACNC: 1.48 UIU/ML (ref 0.36–3.74)
WBC # BLD AUTO: 3.7 K/UL (ref 3.6–11)

## 2023-02-18 ENCOUNTER — PATIENT MESSAGE (OUTPATIENT)
Dept: FAMILY MEDICINE CLINIC | Age: 63
End: 2023-02-18

## 2023-02-18 NOTE — PROGRESS NOTES
Leeanne Thakkar,  The labs look good. Thyroid function is normal.   Kidney function, liver and electrolytes all are good.   No anemia or infection in the labs  Follow up as scheduled    [employer] message sent

## 2023-03-22 NOTE — PROGRESS NOTES
[address], [address] [place] [address]  [phone]    [address] [place] [address] Way      Subjective:      Jenny Terry is a 57 y.o. female is here for new patient consultation. Pmhx as posted below. Family hx + CAD (brother, father);  Drinks alcohol in moderation, denies hx smoking or illegal drug use    Referred by PCP for palpitation, dizziness    Today,    Patient states palpitation more so when things are \"quiet\" such as when she's driving or at night. Started a mos ago. Occurs almost daily. As If her heart is flip flopping. Sometimes she has dizziness with this. No cp or sob    She stays physically active, goes to the [facility] twice a week doing combination of cardio and weights; and 2 days of adrianna-chi exercises. the patient denies chest pain/ shortness of breath, orthopnea, PND, LE edema, syncope, or presyncope.     Patient Active Problem List    Diagnosis Date Noted    Intractable back pain 02/23/2022    Closed compression fracture of lumbosacral spine ([address] 02/21/2022      Ricarda Cleveland, NP  Past Medical History:   Diagnosis Date    Anxiety     Hypertension       Past Surgical History:   Procedure Laterality Date    HX CYST REMOVAL  1972    IR KYPHOPLASTY LUMBAR  2/23/2022     Allergies   Allergen Reactions    Sulfa (Sulfonamide Antibiotics) Rash      Family History   Problem Relation Age of Onset    Diabetes Mother     Stroke Mother     Cancer Father     Heart Disease Father     Hypertension Father     Diabetes Sister     Hypertension Sister     Heart Disease Brother     Hypertension Brother       Social History     Socioeconomic History    Marital status:      Spouse name: Not on file    Number of children: Not on file    Years of education: Not on file    Highest education level: Not on file   Occupational History    Not on file   Tobacco Use    Smoking status: Never    Smokeless tobacco: Never   Vaping Use    Vaping Use: Never used   Substance and Sexual Activity    Alcohol use: Yes     Alcohol/week: 3.0 standard drinks     Types: 3 Cans of beer per week    Drug use: Never    Sexual activity: Not Currently   Other Topics Concern    Not on file   Social History Narrative    Not on file     Social Determinants of Health     Financial Resource Strain: Low Risk     Difficulty of Paying Living Expenses: Not hard at all   Food Insecurity: No Food Insecurity    Worried About Running Out of Food in the Last Year: Never true    Ran Out of Food in the Last Year: Never true   Transportation Needs: Not on file   Physical Activity: Not on file   Stress: Not on file   Social Connections: Not on file   Intimate Partner Violence: Not on file   Housing Stability: Not on file      Current Outpatient Medications   Medication Sig    denosumab (Prolia) 60 mg/mL injection 60 mg by SubCUTAneous route. ALPRAZolam (XANAX) 0.25 mg tablet Take 2 Tablets by mouth nightly as needed for Anxiety. Max Daily Amount: 0.5 mg.    cholecalciferol (VITAMIN D3) 25 mcg (1,000 unit) cap Take 1,000 Units by mouth daily. cannabidiol, CBD, (CANNABIDIOL PO) Take  by mouth.    cyanocobalamin (VITAMIN B12) 100 mcg tablet Vitamin B12    calcium carbonate (CALCIUM 500 PO) Take 500 mg by mouth. No current facility-administered medications for this visit. Review of Symptoms:  11 systems reviewed, negative other than as stated in the HPI    Physical ExamPhysical Exam:    Vitals:    03/29/23 0848   BP: 118/80   Pulse: 82   Resp: 18   Temp: 98.4 °F (36.9 °C)   TempSrc: Temporal   SpO2: 100%   Weight: 135 lb (61.2 kg)   Height: 5' 6\" (1.676 m)     Body mass index is 21.79 kg/m². General PE  Gen:  NAD  Mental Status - Alert. General Appearance - Not in acute distress. HEENT:  PERRL, no carotid bruits or JVD  Chest and Lung Exam   Inspection: Accessory muscles - No use of accessory muscles in breathing.    Auscultation:   Breath sounds: - Normal.   Cardiovascular   Inspection: Jugular vein - Bilateral - Inspection Normal.   Palpation/Percussion:   Apical Impulse: - Normal.   Auscultation: Rhythm - Regular. Heart Sounds - S1 WNL and S2 WNL. No S3 or S4. Murmurs & Other Heart Sounds: Auscultation of the heart reveals - No Murmurs. Peripheral Vascular   Upper Extremity: Inspection - Bilateral - No Cyanotic nailbeds or Digital clubbing. Lower Extremity:   Palpation: Edema - Bilateral - No edema. Abdomen:   Soft, non-tender, bowel sounds are active. Neuro: A&O times 3, CN and motor grossly WNL    Labs:   Lab Results   Component Value Date/Time    Cholesterol, total 174 09/20/2021 02:47 PM    HDL Cholesterol 61 09/20/2021 02:47 PM    LDL, calculated 94 09/20/2021 02:47 PM    Triglyceride 95 09/20/2021 02:47 PM    CHOL/HDL Ratio 2.9 09/20/2021 02:47 PM     No results found for: CPK, CPKX, CPX  Lab Results   Component Value Date/Time    Sodium 142 02/16/2023 10:42 AM    Potassium 5.1 02/16/2023 10:42 AM    Chloride 106 02/16/2023 10:42 AM    CO2 31 02/16/2023 10:42 AM    Anion gap 5 02/16/2023 10:42 AM    Glucose 87 02/16/2023 10:42 AM    BUN 17 02/16/2023 10:42 AM    Creatinine 0.70 02/16/2023 10:42 AM    BUN/Creatinine ratio 24 (H) 02/16/2023 10:42 AM    GFR est AA >60 06/16/2022 11:12 AM    GFR est non-AA >60 06/16/2022 11:12 AM    Calcium 9.6 02/16/2023 10:42 AM    Bilirubin, total 0.5 02/16/2023 10:42 AM    Alk. phosphatase 62 02/16/2023 10:42 AM    Protein, total 6.5 02/16/2023 10:42 AM    Albumin 4.0 02/16/2023 10:42 AM    Globulin 2.5 02/16/2023 10:42 AM    A-G Ratio 1.6 02/16/2023 10:42 AM    ALT (SGPT) 22 02/16/2023 10:42 AM       EKG:  NSR        Assessment:          ICD-10-CM ICD-9-CM    1. Intermittent palpitations  R00.2 785.1 AMB POC EKG ROUTINE W/ 12 LEADS, INTER & REP      CARDIAC EVENT MONITOR      ECHO ADULT COMPLETE      2. Fluttering sensation of heart  R00.2 785.1 AMB POC EKG ROUTINE W/ 12 LEADS, INTER & REP      CARDIAC EVENT MONITOR      ECHO ADULT COMPLETE      3.  Dizziness  R42 780.4 AMB POC EKG ROUTINE W/ 12 LEADS, INTER & REP      CARDIAC EVENT MONITOR      ECHO ADULT COMPLETE      4. Irregular heartbeat  I49.9 427.9 AMB POC EKG ROUTINE W/ 12 LEADS, INTER & REP      CARDIAC EVENT MONITOR      ECHO ADULT COMPLETE      5. Family history of ischemic heart disease  Z82.49 V17.3 AMB POC EKG ROUTINE W/ 12 LEADS, INTER & REP      CARDIAC EVENT MONITOR      ECHO ADULT COMPLETE      CT HEART W/O CONT WITH CALCIUM      6. Sinus bradycardia  R00.1 427.89 CARDIAC EVENT MONITOR      ECHO ADULT COMPLETE          Orders Placed This Encounter    CT HEART W/O CONT WITH CALCIUM     Standing Status:   Future     Standing Expiration Date:   2024    AMB POC EKG ROUTINE W/ 12 LEADS, INTER & REP     Order Specific Question:   Reason for Exam:     Answer:   Palpitations    denosumab (Prolia) 60 mg/mL injection     Si mg by SubCUTAneous route. Plan:     Intermittent palpitation, irregular heart beat  Dizziness  CBC/BMP/THS in 2023 ok  Obtain 1 week event monitor; check echo    Family hx CAD  Interested on obtaining coronary calcium score, will order    Bp controlled    2021 LDL 94      Patient was made aware during visit today that all testing completed would be instantaneously available on their MyChart for review. Discussed that these results will be made available to the provider at the same time. They were advised to wait at least 3 business days to allow for provider's interpretation of results with follow-up before calling our office with concerns about their results.     Continue current care and f/u in May when testing complete        Hetal Sargent NP

## 2023-03-29 ENCOUNTER — OFFICE VISIT (OUTPATIENT)
Dept: CARDIOLOGY CLINIC | Age: 63
End: 2023-03-29
Payer: COMMERCIAL

## 2023-03-29 VITALS
TEMPERATURE: 98.4 F | SYSTOLIC BLOOD PRESSURE: 118 MMHG | BODY MASS INDEX: 21.69 KG/M2 | RESPIRATION RATE: 18 BRPM | DIASTOLIC BLOOD PRESSURE: 80 MMHG | HEIGHT: 66 IN | OXYGEN SATURATION: 100 % | HEART RATE: 82 BPM | WEIGHT: 135 LBS

## 2023-03-29 DIAGNOSIS — R00.2 FLUTTERING SENSATION OF HEART: ICD-10-CM

## 2023-03-29 DIAGNOSIS — R00.1 SINUS BRADYCARDIA: ICD-10-CM

## 2023-03-29 DIAGNOSIS — I49.9 IRREGULAR HEARTBEAT: ICD-10-CM

## 2023-03-29 DIAGNOSIS — R00.2 INTERMITTENT PALPITATIONS: Primary | ICD-10-CM

## 2023-03-29 DIAGNOSIS — Z82.49 FAMILY HISTORY OF ISCHEMIC HEART DISEASE: ICD-10-CM

## 2023-03-29 DIAGNOSIS — R42 DIZZINESS: ICD-10-CM

## 2023-03-29 PROCEDURE — 99204 OFFICE O/P NEW MOD 45 MIN: CPT | Performed by: NURSE PRACTITIONER

## 2023-03-29 PROCEDURE — 93000 ELECTROCARDIOGRAM COMPLETE: CPT | Performed by: NURSE PRACTITIONER

## 2023-03-29 RX ORDER — DENOSUMAB 60 MG/ML
60 INJECTION SUBCUTANEOUS
COMMUNITY

## 2023-03-29 NOTE — PROGRESS NOTES
Identified pt with two pt identifiers(name and ). Reviewed record in preparation for visit and have obtained necessary documentation. Chief Complaint   Patient presents with    New Patient     Referred by PCP Svetlana Springer for PALPITATIONS     Palpitations      Vitals:    23 0848   BP: 118/80   Pulse: 82   Resp: 18   Temp: 98.4 °F (36.9 °C)   TempSrc: Temporal   SpO2: 100%   Weight: 135 lb (61.2 kg)   Height: 5' 6\" (1.676 m)   PainSc:   0 - No pain       Medications reviewed/approved by provider. Health Maintenance Review: Patient reminded of \"due or due soon\" health maintenance. I have asked the patient to contact his/her primary care provider (PCP) for follow-up on his/her health maintenance. Coordination of Care Questionnaire:  :   1) Have you been to an emergency room, urgent care, or hospitalized since your last visit? If yes, where when, and reason for visit? no       2. Have seen or consulted any other health care provider since your last visit? If yes, where when, and reason for visit? NO      Patient is accompanied by self I have received verbal consent from Aydin Ramirez to discuss any/all medical information while they are present in the room.

## 2023-04-18 ENCOUNTER — HOSPITAL ENCOUNTER (OUTPATIENT)
Dept: CT IMAGING | Age: 63
Discharge: HOME | End: 2023-04-18
Attending: NURSE PRACTITIONER
Payer: SELF-PAY

## 2023-04-18 DIAGNOSIS — Z82.49 FAMILY HISTORY OF ISCHEMIC HEART DISEASE: ICD-10-CM

## 2023-04-18 PROCEDURE — 75571 CT HRT W/O DYE W/CA TEST: CPT

## 2023-04-21 ENCOUNTER — HOSPITAL ENCOUNTER (OUTPATIENT)
Dept: INFUSION THERAPY | Age: 63
Discharge: HOME | End: 2023-04-21
Payer: COMMERCIAL

## 2023-04-21 ENCOUNTER — HOSPITAL ENCOUNTER (OUTPATIENT)
Dept: ULTRASOUND IMAGING | Age: 63
Discharge: HOME | End: 2023-04-21
Attending: NURSE PRACTITIONER
Payer: COMMERCIAL

## 2023-04-21 DIAGNOSIS — I49.9 IRREGULAR HEARTBEAT: ICD-10-CM

## 2023-04-21 DIAGNOSIS — R42 DIZZINESS: ICD-10-CM

## 2023-04-21 DIAGNOSIS — R00.2 INTERMITTENT PALPITATIONS: ICD-10-CM

## 2023-04-21 DIAGNOSIS — Z82.49 FAMILY HISTORY OF ISCHEMIC HEART DISEASE: ICD-10-CM

## 2023-04-21 DIAGNOSIS — R00.2 FLUTTERING SENSATION OF HEART: ICD-10-CM

## 2023-04-21 DIAGNOSIS — R00.1 SINUS BRADYCARDIA: ICD-10-CM

## 2023-04-21 LAB
ALBUMIN SERPL-MCNC: 3.9 G/DL (ref 3.5–5)
ANION GAP SERPL CALC-SCNC: 5 MMOL/L (ref 5–15)
BUN SERPL-MCNC: 21 MG/DL (ref 6–20)
BUN/CREAT SERPL: 31 (ref 12–20)
CALCIUM SERPL-MCNC: 8.7 MG/DL (ref 8.5–10.1)
CHLORIDE SERPL-SCNC: 104 MMOL/L (ref 97–108)
CO2 SERPL-SCNC: 30 MMOL/L (ref 21–32)
CREAT SERPL-MCNC: 0.68 MG/DL (ref 0.55–1.02)
GLUCOSE SERPL-MCNC: 91 MG/DL (ref 65–100)
MAGNESIUM SERPL-MCNC: 2.3 MG/DL (ref 1.6–2.4)
PHOSPHATE SERPL-MCNC: 3 MG/DL (ref 2.6–4.7)
PHOSPHATE SERPL-MCNC: 3.1 MG/DL (ref 2.6–4.7)
POTASSIUM SERPL-SCNC: 4.5 MMOL/L (ref 3.5–5.1)
SODIUM SERPL-SCNC: 139 MMOL/L (ref 136–145)

## 2023-04-21 PROCEDURE — 84100 ASSAY OF PHOSPHORUS: CPT

## 2023-04-21 PROCEDURE — 93306 TTE W/DOPPLER COMPLETE: CPT

## 2023-04-21 PROCEDURE — 83735 ASSAY OF MAGNESIUM: CPT

## 2023-04-21 PROCEDURE — 36415 COLL VENOUS BLD VENIPUNCTURE: CPT

## 2023-04-21 PROCEDURE — 80069 RENAL FUNCTION PANEL: CPT

## 2023-04-21 NOTE — PROGRESS NOTES
[facility] Lab Visit:    Margie Claudio  1960  [id number]    0909:  Pt arrived ambulatory. Labs drawn peripherally and sent for processing. Departed [facility] ambulatory and in no distress. There were no vitals taken for this visit.

## 2023-04-22 LAB
ECHO AO ROOT DIAM: 3 CM
ECHO AV PEAK GRADIENT: 4 MMHG
ECHO AV PEAK VELOCITY: 1.1 M/S
ECHO EST RA PRESSURE: 10 MMHG
ECHO LA DIAMETER: 3.3 CM
ECHO LA TO AORTIC ROOT RATIO: 1.1
ECHO LV E' LATERAL VELOCITY: 12 CM/S
ECHO LV FRACTIONAL SHORTENING: 30 % (ref 28–44)
ECHO LV INTERNAL DIMENSION DIASTOLIC: 4.4 CM (ref 3.9–5.3)
ECHO LV INTERNAL DIMENSION SYSTOLIC: 3.1 CM
ECHO LV IVSD: 0.9 CM (ref 0.6–0.9)
ECHO LV MASS 2D: 118.6 G (ref 67–162)
ECHO LV POSTERIOR WALL DIASTOLIC: 0.8 CM (ref 0.6–0.9)
ECHO LV RELATIVE WALL THICKNESS RATIO: 0.36
ECHO LVOT AREA: 3.1 CM2
ECHO LVOT DIAM: 2 CM
ECHO MV A VELOCITY: 0.59 M/S
ECHO MV E DECELERATION TIME (DT): 172.1 MS
ECHO MV E VELOCITY: 0.74 M/S
ECHO MV E/A RATIO: 1.25
ECHO MV E/E' LATERAL: 6.17
ECHO PULMONARY ARTERY END DIASTOLIC PRESSURE: 2 MMHG
ECHO PV REGURGITANT MAX VELOCITY: 0.7 M/S
ECHO RIGHT VENTRICULAR SYSTOLIC PRESSURE (RVSP): 27 MMHG
ECHO TV REGURGITANT MAX VELOCITY: 2.07 M/S
ECHO TV REGURGITANT PEAK GRADIENT: 17 MMHG

## 2023-04-22 PROCEDURE — 93306 TTE W/DOPPLER COMPLETE: CPT | Performed by: INTERNAL MEDICINE

## 2023-04-24 ENCOUNTER — LAB ONLY (OUTPATIENT)
Dept: FAMILY MEDICINE CLINIC | Age: 63
End: 2023-04-24
Payer: COMMERCIAL

## 2023-04-24 DIAGNOSIS — Z23 ENCOUNTER FOR IMMUNIZATION: Primary | ICD-10-CM

## 2023-04-24 PROCEDURE — 90471 IMMUNIZATION ADMIN: CPT | Performed by: NURSE PRACTITIONER

## 2023-04-24 PROCEDURE — 90715 TDAP VACCINE 7 YRS/> IM: CPT | Performed by: NURSE PRACTITIONER

## 2023-04-24 NOTE — PROGRESS NOTES
BOOSTRIX administered in LEFT DELTOID. Patient tolerated medication well. AVS provided to patient with medication information. Patient states understanding.

## 2023-04-30 ENCOUNTER — TELEPHONE (OUTPATIENT)
Dept: CARDIOLOGY CLINIC | Age: 63
End: 2023-04-30

## 2023-05-02 ENCOUNTER — HOSPITAL ENCOUNTER (OUTPATIENT)
Dept: INFUSION THERAPY | Age: 63
Discharge: HOME | End: 2023-05-02

## 2023-05-13 SDOH — ECONOMIC STABILITY: TRANSPORTATION INSECURITY
IN THE PAST 12 MONTHS, HAS LACK OF TRANSPORTATION KEPT YOU FROM MEETINGS, WORK, OR FROM GETTING THINGS NEEDED FOR DAILY LIVING?: NO

## 2023-05-13 SDOH — ECONOMIC STABILITY: INCOME INSECURITY: HOW HARD IS IT FOR YOU TO PAY FOR THE VERY BASICS LIKE FOOD, HOUSING, MEDICAL CARE, AND HEATING?: NOT HARD AT ALL

## 2023-05-13 SDOH — ECONOMIC STABILITY: HOUSING INSECURITY
IN THE LAST 12 MONTHS, WAS THERE A TIME WHEN YOU DID NOT HAVE A STEADY PLACE TO SLEEP OR SLEPT IN A SHELTER (INCLUDING NOW)?: NO

## 2023-05-13 SDOH — ECONOMIC STABILITY: FOOD INSECURITY: WITHIN THE PAST 12 MONTHS, YOU WORRIED THAT YOUR FOOD WOULD RUN OUT BEFORE YOU GOT MONEY TO BUY MORE.: NEVER TRUE

## 2023-05-13 SDOH — ECONOMIC STABILITY: FOOD INSECURITY: WITHIN THE PAST 12 MONTHS, THE FOOD YOU BOUGHT JUST DIDN'T LAST AND YOU DIDN'T HAVE MONEY TO GET MORE.: NEVER TRUE

## 2023-05-16 ENCOUNTER — OFFICE VISIT (OUTPATIENT)
Age: 63
End: 2023-05-16
Payer: COMMERCIAL

## 2023-05-16 VITALS
SYSTOLIC BLOOD PRESSURE: 116 MMHG | DIASTOLIC BLOOD PRESSURE: 66 MMHG | TEMPERATURE: 97.4 F | OXYGEN SATURATION: 96 % | RESPIRATION RATE: 16 BRPM | HEIGHT: 66 IN | HEART RATE: 60 BPM | BODY MASS INDEX: 21.86 KG/M2 | WEIGHT: 136 LBS

## 2023-05-16 DIAGNOSIS — R00.2 PALPITATIONS: Primary | ICD-10-CM

## 2023-05-16 DIAGNOSIS — F41.9 ANXIETY: ICD-10-CM

## 2023-05-16 PROCEDURE — 99213 OFFICE O/P EST LOW 20 MIN: CPT | Performed by: NURSE PRACTITIONER

## 2023-05-16 RX ORDER — CALCIUM CARBONATE 500(1250)
500 TABLET ORAL DAILY
COMMUNITY

## 2023-05-16 RX ORDER — ALPRAZOLAM 0.25 MG/1
0.25 TABLET ORAL NIGHTLY PRN
Qty: 10 TABLET | Refills: 0 | Status: SHIPPED | OUTPATIENT
Start: 2023-05-16 | End: 2023-06-15

## 2023-05-16 ASSESSMENT — PATIENT HEALTH QUESTIONNAIRE - PHQ9
SUM OF ALL RESPONSES TO PHQ QUESTIONS 1-9: 0
SUM OF ALL RESPONSES TO PHQ9 QUESTIONS 1 & 2: 0
2. FEELING DOWN, DEPRESSED OR HOPELESS: 0
SUM OF ALL RESPONSES TO PHQ QUESTIONS 1-9: 0
SUM OF ALL RESPONSES TO PHQ QUESTIONS 1-9: 0
1. LITTLE INTEREST OR PLEASURE IN DOING THINGS: 0
SUM OF ALL RESPONSES TO PHQ QUESTIONS 1-9: 0

## 2023-05-16 ASSESSMENT — ANXIETY QUESTIONNAIRES
IF YOU CHECKED OFF ANY PROBLEMS ON THIS QUESTIONNAIRE, HOW DIFFICULT HAVE THESE PROBLEMS MADE IT FOR YOU TO DO YOUR WORK, TAKE CARE OF THINGS AT HOME, OR GET ALONG WITH OTHER PEOPLE: SOMEWHAT DIFFICULT
6. BECOMING EASILY ANNOYED OR IRRITABLE: 0
4. TROUBLE RELAXING: 1
1. FEELING NERVOUS, ANXIOUS, OR ON EDGE: 1
5. BEING SO RESTLESS THAT IT IS HARD TO SIT STILL: 0
3. WORRYING TOO MUCH ABOUT DIFFERENT THINGS: 3
7. FEELING AFRAID AS IF SOMETHING AWFUL MIGHT HAPPEN: 0
GAD7 TOTAL SCORE: 8
2. NOT BEING ABLE TO STOP OR CONTROL WORRYING: 3

## 2023-05-16 ASSESSMENT — ENCOUNTER SYMPTOMS
COLOR CHANGE: 0
EYE DISCHARGE: 0
APNEA: 0
ABDOMINAL DISTENTION: 0

## 2023-05-16 NOTE — PROGRESS NOTES
1. \"Have you been to the ER, urgent care clinic since your last visit? Hospitalized since your last visit? \" No    2. \"Have you seen or consulted any other health care providers outside of the [address] [place] [address] since your last visit? \" No     3. For patients aged 39-70: Has the patient had a colonoscopy / FIT/ Cologuard? Yes - no Care Gap present     If the patient is female:    4. For patients aged 41-77: Has the patient had a mammogram within the past 2 years? Yes - no Care Gap present    5. For patients aged 21-65: Has the patient had a pap smear?  Due, has appointment

## 2023-05-16 NOTE — PROGRESS NOTES
Cesar Funes is a 57 y.o. female who presents today with c/o   Chief Complaint   Patient presents with    Heart Problem     Follow-up    Anxiety           Assessment/ Plan:       ASSESSMENT AND PLAN    Diagnoses:  1. Anxiety  #10 xanax provided for patient to take PRN on her vacation  Follow up in 6 months. If additional refills needed on the xanax then recommend follow up in 3 months      2. Palpitations  Stable--improving  Follow up with cardiology as scheduled  Heart monitor: 1 short run of SVT which lasted <10 seconds. No treatment needed at this time. Echo 4/2023-->normal            No orders of the defined types were placed in this encounter. Return in about 6 months (around 11/16/2023). Subjective:  Cesar Funes is a 57 y.o. pleasant female here today for follow up on her palpitations. She was previously referred to cardiology on 2/16/2023 for evaluation. Echo was completed on 4/22/2023 which was normal. Heart monitor completed with non-sutained SVT. She is feeling better today. Has follow up with cardiology on 5/26/2023. Of note, she will be going on a trip to [facility] with her two sisters next month and she has difficulty falling asleep in different areas. Takes xanax PRN for anxiety/sleep ever since her  passed away. Ten tablets will last her about three months. I will provide her with ten tablets for her trip and if she needs additional refills in the next six months she will come in sooner to see me again.     Patient Active Problem List   Diagnosis    Closed compression fracture of lumbosacral spine (HCC)    Intractable back pain       Past Medical History:   Diagnosis Date    Anxiety     Hypertension          Current Outpatient Medications:     CANNABIDIOL PO, Take 1 mL by mouth daily, Disp: , Rfl:     calcium carbonate (OSCAL) 500 MG TABS tablet, Take 1 tablet by mouth daily, Disp: , Rfl:     ALPRAZolam (XANAX) 0.25 MG tablet, Take 1 tablet by mouth nightly as needed

## 2023-05-22 NOTE — PROGRESS NOTES
[facility]     [address] [facility], [address]  [address] [facility], [facility]  [address] [place], [address] [facility]     Subjective:          Patel Blandon is a 57 y.o. female is here for routine f/u. Initially seen by me in 3/2023 for palpitation and dizziness    Echo was fine,  1 mos event showed 1 short run of SVT less than 10 seconds   She stays physically active, goes to the [facility] twice a week doing combination of cardio and weights; and 2 days of sharri-chi exercises. Still having occasional palpitation,  mild/better overall. The patient denies chest pain/ shortness of breath, orthopnea, PND, LE edema, syncope, presyncope or fatigue. Patient Active Problem List    Diagnosis Date Noted    Intractable back pain 02/23/2022    Closed compression fracture of lumbosacral spine ([facility] [address] 02/21/2022      Francesco Babb, APRN - NP  Past Medical History:   Diagnosis Date    Anxiety     Hypertension       Past Surgical History:   Procedure Laterality Date    CYST REMOVAL  1972    IR KYPHOPLASTY LUMBAR FIRST LEVEL  2/23/2022    IR KYPHOPLASTY LUMBAR FIRST LEVEL 2/23/2022 [facility] ANGIO IR    IR KYPHOPLASTY LUMBAR FIRST LEVEL  2/23/2022     Allergies   Allergen Reactions    Sulfa Antibiotics Rash      Family History   Problem Relation Age of Onset    Stroke Mother     Hypertension Brother     Heart Disease Brother     Hypertension Sister     Diabetes Sister     Hypertension Father     Heart Disease Father     Cancer Father     Diabetes Mother       Social History     Socioeconomic History    Marital status:      Spouse name: Not on file    Number of children: Not on file    Years of education: Not on file    Highest education level: Not on file   Occupational History    Not on file   Tobacco Use    Smoking status: Never    Smokeless tobacco: Never   Vaping Use    Vaping Use: Never used   Substance and Sexual Activity    Alcohol use:  Yes

## 2023-05-23 DIAGNOSIS — M81.0 OSTEOPOROSIS, UNSPECIFIED OSTEOPOROSIS TYPE, UNSPECIFIED PATHOLOGICAL FRACTURE PRESENCE: Primary | ICD-10-CM

## 2023-05-23 RX ORDER — ALBUTEROL SULFATE 90 UG/1
4 AEROSOL, METERED RESPIRATORY (INHALATION) PRN
OUTPATIENT
Start: 2023-05-23

## 2023-05-23 RX ORDER — SODIUM CHLORIDE 9 MG/ML
INJECTION, SOLUTION INTRAVENOUS CONTINUOUS
OUTPATIENT
Start: 2023-05-23

## 2023-05-23 RX ORDER — ONDANSETRON 2 MG/ML
8 INJECTION INTRAMUSCULAR; INTRAVENOUS
OUTPATIENT
Start: 2023-05-23

## 2023-05-23 RX ORDER — ACETAMINOPHEN 325 MG/1
650 TABLET ORAL
OUTPATIENT
Start: 2023-05-23

## 2023-05-23 RX ORDER — DIPHENHYDRAMINE HYDROCHLORIDE 50 MG/ML
50 INJECTION INTRAMUSCULAR; INTRAVENOUS
OUTPATIENT
Start: 2023-05-23

## 2023-05-23 RX ORDER — EPINEPHRINE 1 MG/ML
0.3 INJECTION, SOLUTION, CONCENTRATE INTRAVENOUS PRN
OUTPATIENT
Start: 2023-05-23

## 2023-05-23 RX ORDER — FAMOTIDINE 10 MG/ML
20 INJECTION, SOLUTION INTRAVENOUS
OUTPATIENT
Start: 2023-05-23

## 2023-05-26 ENCOUNTER — OFFICE VISIT (OUTPATIENT)
Age: 63
End: 2023-05-26

## 2023-05-26 VITALS
RESPIRATION RATE: 18 BRPM | TEMPERATURE: 97.6 F | HEART RATE: 61 BPM | BODY MASS INDEX: 21.86 KG/M2 | OXYGEN SATURATION: 98 % | HEIGHT: 66 IN | DIASTOLIC BLOOD PRESSURE: 78 MMHG | SYSTOLIC BLOOD PRESSURE: 126 MMHG | WEIGHT: 136 LBS

## 2023-05-26 DIAGNOSIS — I47.1 SVT (SUPRAVENTRICULAR TACHYCARDIA) (HCC): ICD-10-CM

## 2023-05-26 DIAGNOSIS — R42 DIZZINESS AND GIDDINESS: ICD-10-CM

## 2023-05-26 DIAGNOSIS — I49.9 CARDIAC ARRHYTHMIA, UNSPECIFIED CARDIAC ARRHYTHMIA TYPE: ICD-10-CM

## 2023-05-26 DIAGNOSIS — R00.1 BRADYCARDIA, UNSPECIFIED: ICD-10-CM

## 2023-05-26 DIAGNOSIS — R93.1 AGATSTON CAC SCORE, <100: ICD-10-CM

## 2023-05-26 DIAGNOSIS — R00.2 PALPITATIONS: ICD-10-CM

## 2023-05-26 DIAGNOSIS — Z82.49 FAMILY HISTORY OF ISCHEMIC HEART DISEASE AND OTHER DISEASES OF THE CIRCULATORY SYSTEM: Primary | ICD-10-CM

## 2023-05-26 ASSESSMENT — PATIENT HEALTH QUESTIONNAIRE - PHQ9
SUM OF ALL RESPONSES TO PHQ QUESTIONS 1-9: 0
SUM OF ALL RESPONSES TO PHQ QUESTIONS 1-9: 0
2. FEELING DOWN, DEPRESSED OR HOPELESS: 0
1. LITTLE INTEREST OR PLEASURE IN DOING THINGS: 0
SUM OF ALL RESPONSES TO PHQ9 QUESTIONS 1 & 2: 0
SUM OF ALL RESPONSES TO PHQ QUESTIONS 1-9: 0
SUM OF ALL RESPONSES TO PHQ QUESTIONS 1-9: 0

## 2023-05-26 NOTE — PROGRESS NOTES
Identified pt with two pt identifiers(name and ). Reviewed record in preparation for visit and have obtained necessary documentation. Chief Complaint   Patient presents with    Irregular Heart Beat     Bradycardia     Palpitations    3 Month Follow-Up      /78 (Site: Left Upper Arm, Position: Sitting, Cuff Size: Medium Adult)   Pulse 61   Temp 97.6 °F (36.4 °C) (Temporal)   Resp 18   Ht 5' 6\" (1.676 m)   Wt 136 lb (61.7 kg)   SpO2 98%   BMI 21.95 kg/m²       Medications reviewed/approved by provider. Health Maintenance Review: Patient reminded of \"due or due soon\" health maintenance. I have asked the patient to contact his/her primary care provider (PCP) for follow-up on his/her health maintenance. Coordination of Care Questionnaire:  :   1) Have you been to an emergency room, urgent care, or hospitalized since your last visit? If yes, where when, and reason for visit? no       2. Have seen or consulted any other health care provider since your last visit? If yes, where when, and reason for visit?  no      Patient is accompanied by Self I have received verbal consent from Toby Mccracken to discuss any/all medical information while they are present in the room.

## 2023-05-31 ENCOUNTER — HOSPITAL ENCOUNTER (OUTPATIENT)
Facility: HOSPITAL | Age: 63
Setting detail: INFUSION SERIES
End: 2023-05-31
Payer: COMMERCIAL

## 2023-05-31 VITALS
HEART RATE: 63 BPM | SYSTOLIC BLOOD PRESSURE: 108 MMHG | DIASTOLIC BLOOD PRESSURE: 54 MMHG | RESPIRATION RATE: 16 BRPM | TEMPERATURE: 98 F | OXYGEN SATURATION: 98 % | WEIGHT: 138.2 LBS | BODY MASS INDEX: 22.21 KG/M2 | HEIGHT: 66 IN

## 2023-05-31 DIAGNOSIS — M81.0 OSTEOPOROSIS, UNSPECIFIED OSTEOPOROSIS TYPE, UNSPECIFIED PATHOLOGICAL FRACTURE PRESENCE: Primary | ICD-10-CM

## 2023-05-31 LAB
ALBUMIN SERPL-MCNC: 3.7 G/DL (ref 3.5–5)
ANION GAP SERPL CALC-SCNC: 5 MMOL/L (ref 5–15)
BUN SERPL-MCNC: 17 MG/DL (ref 6–20)
BUN/CREAT SERPL: 24 (ref 12–20)
CALCIUM SERPL-MCNC: 8.9 MG/DL (ref 8.5–10.1)
CHLORIDE SERPL-SCNC: 104 MMOL/L (ref 97–108)
CO2 SERPL-SCNC: 31 MMOL/L (ref 21–32)
CREAT SERPL-MCNC: 0.72 MG/DL (ref 0.55–1.02)
GLUCOSE SERPL-MCNC: 88 MG/DL (ref 65–100)
MAGNESIUM SERPL-MCNC: 1.9 MG/DL (ref 1.6–2.4)
PHOSPHATE SERPL-MCNC: 3.8 MG/DL (ref 2.6–4.7)
POTASSIUM SERPL-SCNC: 4.5 MMOL/L (ref 3.5–5.1)
SODIUM SERPL-SCNC: 140 MMOL/L (ref 136–145)

## 2023-05-31 PROCEDURE — 80069 RENAL FUNCTION PANEL: CPT

## 2023-05-31 PROCEDURE — 96372 THER/PROPH/DIAG INJ SC/IM: CPT

## 2023-05-31 PROCEDURE — 36415 COLL VENOUS BLD VENIPUNCTURE: CPT

## 2023-05-31 PROCEDURE — 6360000002 HC RX W HCPCS: Performed by: NURSE PRACTITIONER

## 2023-05-31 PROCEDURE — 83735 ASSAY OF MAGNESIUM: CPT

## 2023-05-31 RX ORDER — ALBUTEROL SULFATE 90 UG/1
4 AEROSOL, METERED RESPIRATORY (INHALATION) PRN
Status: ACTIVE | OUTPATIENT
Start: 2023-05-31

## 2023-05-31 RX ORDER — ONDANSETRON 2 MG/ML
8 INJECTION INTRAMUSCULAR; INTRAVENOUS
OUTPATIENT
Start: 2023-11-29

## 2023-05-31 RX ORDER — DIPHENHYDRAMINE HYDROCHLORIDE 50 MG/ML
50 INJECTION INTRAMUSCULAR; INTRAVENOUS
Status: ACTIVE | OUTPATIENT
Start: 2023-05-31 | End: 2023-06-01

## 2023-05-31 RX ORDER — ALBUTEROL SULFATE 90 UG/1
4 AEROSOL, METERED RESPIRATORY (INHALATION) PRN
OUTPATIENT
Start: 2023-11-29

## 2023-05-31 RX ORDER — EPINEPHRINE 1 MG/ML
0.3 INJECTION, SOLUTION, CONCENTRATE INTRAVENOUS PRN
Status: ACTIVE | OUTPATIENT
Start: 2023-05-31

## 2023-05-31 RX ORDER — DIPHENHYDRAMINE HYDROCHLORIDE 50 MG/ML
50 INJECTION INTRAMUSCULAR; INTRAVENOUS
OUTPATIENT
Start: 2023-11-29

## 2023-05-31 RX ORDER — EPINEPHRINE 1 MG/ML
0.3 INJECTION, SOLUTION, CONCENTRATE INTRAVENOUS PRN
OUTPATIENT
Start: 2023-11-29

## 2023-05-31 RX ORDER — ACETAMINOPHEN 325 MG/1
650 TABLET ORAL
OUTPATIENT
Start: 2023-11-29

## 2023-05-31 RX ORDER — SODIUM CHLORIDE 9 MG/ML
INJECTION, SOLUTION INTRAVENOUS CONTINUOUS
Status: DISPENSED | OUTPATIENT
Start: 2023-05-31

## 2023-05-31 RX ORDER — SODIUM CHLORIDE 9 MG/ML
INJECTION, SOLUTION INTRAVENOUS CONTINUOUS
OUTPATIENT
Start: 2023-11-29

## 2023-05-31 RX ORDER — ACETAMINOPHEN 325 MG/1
650 TABLET ORAL
Status: ACTIVE | OUTPATIENT
Start: 2023-05-31 | End: 2023-06-01

## 2023-05-31 RX ORDER — ONDANSETRON 2 MG/ML
8 INJECTION INTRAMUSCULAR; INTRAVENOUS
Status: ACTIVE | OUTPATIENT
Start: 2023-05-31 | End: 2023-06-01

## 2023-05-31 RX ADMIN — DENOSUMAB 60 MG: 60 INJECTION SUBCUTANEOUS at 12:57

## 2023-05-31 NOTE — PROGRESS NOTES
[facility] Progress Note    Date: May 31, 2023    Name: Keith Stapleton    MRN: [id number]         : 1960      Ms. Smalls was assessed and education was provided. Ms. Cathy Elizalde vitals were reviewed and patient was observed for 5 minutes prior to treatment. Vitals:    23 1045   BP: (!) 108/54   Pulse: 63   Resp: 16   Temp: 98 °F (36.7 °C)   SpO2: 98%       Lab results were obtained and reviewed. Recent Results (from the past 12 hour(s))   Renal Function Panel    Collection Time: 23 10:50 AM   Result Value Ref Range    Sodium 140 136 - 145 mmol/L    Potassium 4.5 3.5 - 5.1 mmol/L    Chloride 104 97 - 108 mmol/L    CO2 31 21 - 32 mmol/L    Anion Gap 5 5 - 15 mmol/L    Glucose 88 65 - 100 mg/dL    BUN 17 6 - 20 MG/DL    Creatinine 0.72 0.55 - 1.02 MG/DL    Bun/Cre Ratio 24 (H) 12 - 20      Est, Glom Filt Rate >60 >60 ml/min/1.73m2    Calcium 8.9 8.5 - 10.1 MG/DL    Phosphorus 3.8 2.6 - 4.7 MG/DL    Albumin 3.7 3.5 - 5.0 g/dL   Magnesium    Collection Time: 23 10:50 AM   Result Value Ref Range    Magnesium 1.9 1.6 - 2.4 mg/dL       Prolia 60mg was administered subcutaneous in  left arm. Ms. Johana Calix tolerated well, and had no complaints. Patient armband was removed and shredded. Ms. Johana Calix was discharged from [place] [address] in stable condition at [phone]. She is to return on 23 at 1300 for her next appointment.     Mady Alexander RN  May 31, 2023  1:20 PM

## 2023-11-07 ENCOUNTER — OFFICE VISIT (OUTPATIENT)
Age: 63
End: 2023-11-07
Payer: COMMERCIAL

## 2023-11-07 VITALS
HEIGHT: 66 IN | DIASTOLIC BLOOD PRESSURE: 76 MMHG | RESPIRATION RATE: 18 BRPM | WEIGHT: 135.5 LBS | OXYGEN SATURATION: 98 % | SYSTOLIC BLOOD PRESSURE: 118 MMHG | BODY MASS INDEX: 21.78 KG/M2 | HEART RATE: 66 BPM | TEMPERATURE: 99.3 F

## 2023-11-07 DIAGNOSIS — Z00.00 HEALTHCARE MAINTENANCE: Primary | ICD-10-CM

## 2023-11-07 DIAGNOSIS — F41.9 ANXIETY: ICD-10-CM

## 2023-11-07 DIAGNOSIS — M81.0 OSTEOPOROSIS, UNSPECIFIED OSTEOPOROSIS TYPE, UNSPECIFIED PATHOLOGICAL FRACTURE PRESENCE: ICD-10-CM

## 2023-11-07 LAB
BASOPHILS # BLD: 0 K/UL (ref 0–0.1)
BASOPHILS NFR BLD: 1 % (ref 0–1)
DIFFERENTIAL METHOD BLD: NORMAL
EOSINOPHIL # BLD: 0.1 K/UL (ref 0–0.4)
EOSINOPHIL NFR BLD: 3 % (ref 0–7)
ERYTHROCYTE [DISTWIDTH] IN BLOOD BY AUTOMATED COUNT: 12.6 % (ref 11.5–14.5)
HCT VFR BLD AUTO: 44.2 % (ref 35–47)
HGB BLD-MCNC: 14.1 G/DL (ref 11.5–16)
IMM GRANULOCYTES # BLD AUTO: 0 K/UL (ref 0–0.04)
IMM GRANULOCYTES NFR BLD AUTO: 0 % (ref 0–0.5)
LYMPHOCYTES # BLD: 1.4 K/UL (ref 0.8–3.5)
LYMPHOCYTES NFR BLD: 37 % (ref 12–49)
MCH RBC QN AUTO: 29.8 PG (ref 26–34)
MCHC RBC AUTO-ENTMCNC: 31.9 G/DL (ref 30–36.5)
MCV RBC AUTO: 93.4 FL (ref 80–99)
MONOCYTES # BLD: 0.3 K/UL (ref 0–1)
MONOCYTES NFR BLD: 7 % (ref 5–13)
NEUTS SEG # BLD: 2 K/UL (ref 1.8–8)
NEUTS SEG NFR BLD: 52 % (ref 32–75)
NRBC # BLD: 0 K/UL (ref 0–0.01)
NRBC BLD-RTO: 0 PER 100 WBC
PLATELET # BLD AUTO: 168 K/UL (ref 150–400)
PMV BLD AUTO: 12.6 FL (ref 8.9–12.9)
RBC # BLD AUTO: 4.73 M/UL (ref 3.8–5.2)
WBC # BLD AUTO: 3.8 K/UL (ref 3.6–11)

## 2023-11-07 PROCEDURE — 99396 PREV VISIT EST AGE 40-64: CPT | Performed by: NURSE PRACTITIONER

## 2023-11-07 PROCEDURE — 36415 COLL VENOUS BLD VENIPUNCTURE: CPT | Performed by: NURSE PRACTITIONER

## 2023-11-07 RX ORDER — ALPRAZOLAM 0.25 MG/1
0.25 TABLET ORAL NIGHTLY PRN
Qty: 10 TABLET | Refills: 0 | Status: SHIPPED | OUTPATIENT
Start: 2023-11-07 | End: 2023-12-07

## 2023-11-07 ASSESSMENT — PATIENT HEALTH QUESTIONNAIRE - PHQ9
SUM OF ALL RESPONSES TO PHQ QUESTIONS 1-9: 0
SUM OF ALL RESPONSES TO PHQ QUESTIONS 1-9: 0
2. FEELING DOWN, DEPRESSED OR HOPELESS: 0
1. LITTLE INTEREST OR PLEASURE IN DOING THINGS: 0
SUM OF ALL RESPONSES TO PHQ QUESTIONS 1-9: 0
SUM OF ALL RESPONSES TO PHQ9 QUESTIONS 1 & 2: 0
SUM OF ALL RESPONSES TO PHQ QUESTIONS 1-9: 0

## 2023-11-07 ASSESSMENT — ENCOUNTER SYMPTOMS
CHEST TIGHTNESS: 0
ABDOMINAL DISTENTION: 0
EYE DISCHARGE: 0

## 2023-11-07 NOTE — PROGRESS NOTES
James Ag is a 57 y.o. female who presents today with c/o   Chief Complaint   Patient presents with    Anxiety    healthcare maintenance    Osteoporosis           Assessment/ Plan:       ASSESSMENT AND PLAN    Diagnoses:  1. Anxiety  Continue xanax as needed  Check TSH today      2. Healthcare maintenance  Check CBC, CMP and lipid today      3. Osteoporosis  Check phosphorus and mag today     Continue with prolia  Repeat Dexa 6/2024      Orders Placed This Encounter   Procedures    CBC with Auto Differential     Standing Status:   Future     Number of Occurrences:   1     Standing Expiration Date:   11/7/2024    Comprehensive Metabolic Panel     Standing Status:   Future     Number of Occurrences:   1     Standing Expiration Date:   11/7/2024    Lipid Panel     Standing Status:   Future     Number of Occurrences:   1     Standing Expiration Date:   11/7/2024    TSH     Standing Status:   Future     Number of Occurrences:   1     Standing Expiration Date:   11/7/2024    UT COLLECTION VENOUS BLOOD VENIPUNCTURE     Return in about 6 months (around 5/7/2024). Subjective:  James Ag is a 57 y.o. pleasant female here today for follow up and labwork. She has a hx of anxiety which she has been managing well. She takes xanax very sparingly. Her  passed away in 2019 and this is when she started taking the xanax. Healthcare maintenance: She would like her basic labs checked today. She is not fasting today. Osteoporosis: She suffered a closed compression fracture of her lumbosacral spine (2/2022) after falling at home. Her dexa scan was completed on 6/2022 with osteopenic change of her lumbar spine and therefore she was started on prolia to help prevent further bone loss. She is tolerating well. She would like us to check her labs for her today for her upcoming prolia shot.       Patient Active Problem List   Diagnosis    Closed compression fracture of lumbosacral spine (HCC)    Intractable

## 2023-11-08 ENCOUNTER — TELEPHONE (OUTPATIENT)
Age: 63
End: 2023-11-08

## 2023-11-08 LAB
ALBUMIN SERPL-MCNC: 3.7 G/DL (ref 3.5–5)
ALBUMIN/GLOB SERPL: 1.4 (ref 1.1–2.2)
ALP SERPL-CCNC: 66 U/L (ref 45–117)
ALT SERPL-CCNC: 17 U/L (ref 12–78)
ANION GAP SERPL CALC-SCNC: 4 MMOL/L (ref 5–15)
AST SERPL-CCNC: 14 U/L (ref 15–37)
BILIRUB SERPL-MCNC: 0.4 MG/DL (ref 0.2–1)
BUN SERPL-MCNC: 20 MG/DL (ref 6–20)
BUN/CREAT SERPL: 28 (ref 12–20)
CALCIUM SERPL-MCNC: 8.6 MG/DL (ref 8.5–10.1)
CHLORIDE SERPL-SCNC: 106 MMOL/L (ref 97–108)
CHOLEST SERPL-MCNC: 195 MG/DL
CO2 SERPL-SCNC: 29 MMOL/L (ref 21–32)
CREAT SERPL-MCNC: 0.72 MG/DL (ref 0.55–1.02)
GLOBULIN SER CALC-MCNC: 2.7 G/DL (ref 2–4)
GLUCOSE SERPL-MCNC: 81 MG/DL (ref 65–100)
HDLC SERPL-MCNC: 70 MG/DL
HDLC SERPL: 2.8 (ref 0–5)
LDLC SERPL CALC-MCNC: 109 MG/DL (ref 0–100)
MAGNESIUM SERPL-MCNC: 2.1 MG/DL (ref 1.6–2.4)
PHOSPHATE SERPL-MCNC: 3.4 MG/DL (ref 2.6–4.7)
POTASSIUM SERPL-SCNC: 4.4 MMOL/L (ref 3.5–5.1)
PROT SERPL-MCNC: 6.4 G/DL (ref 6.4–8.2)
SODIUM SERPL-SCNC: 139 MMOL/L (ref 136–145)
TRIGL SERPL-MCNC: 80 MG/DL
TSH SERPL DL<=0.05 MIU/L-ACNC: 1.64 UIU/ML (ref 0.36–3.74)
VLDLC SERPL CALC-MCNC: 16 MG/DL

## 2023-11-08 NOTE — TELEPHONE ENCOUNTER
Pt was seen yesterday but now has a uti. Should she still do a virtual appt or just bring in a urine?

## 2023-11-09 ENCOUNTER — SCHEDULED TELEPHONE ENCOUNTER (OUTPATIENT)
Age: 63
End: 2023-11-09
Payer: COMMERCIAL

## 2023-11-09 ENCOUNTER — NURSE ONLY (OUTPATIENT)
Age: 63
End: 2023-11-09
Payer: COMMERCIAL

## 2023-11-09 DIAGNOSIS — R39.9 UTI SYMPTOMS: Primary | ICD-10-CM

## 2023-11-09 DIAGNOSIS — R30.0 DYSURIA: Primary | ICD-10-CM

## 2023-11-09 DIAGNOSIS — R39.9 UTI SYMPTOMS: ICD-10-CM

## 2023-11-09 LAB
BILIRUBIN, URINE, POC: ABNORMAL
BLOOD URINE, POC: NEGATIVE
GLUCOSE URINE, POC: ABNORMAL
KETONES, URINE, POC: ABNORMAL
LEUKOCYTE ESTERASE, URINE, POC: ABNORMAL
NITRITE, URINE, POC: NEGATIVE
PH, URINE, POC: 5.5 (ref 4.6–8)
PROTEIN,URINE, POC: ABNORMAL
SPECIFIC GRAVITY, URINE, POC: 1.02 (ref 1–1.03)
URINALYSIS CLARITY, POC: ABNORMAL
URINALYSIS COLOR, POC: YELLOW
UROBILINOGEN, POC: ABNORMAL

## 2023-11-09 PROCEDURE — 81003 URINALYSIS AUTO W/O SCOPE: CPT

## 2023-11-09 PROCEDURE — 99441 PR PHYS/QHP TELEPHONE EVALUATION 5-10 MIN: CPT | Performed by: NURSE PRACTITIONER

## 2023-11-09 RX ORDER — NITROFURANTOIN 25; 75 MG/1; MG/1
100 CAPSULE ORAL 2 TIMES DAILY
Qty: 14 CAPSULE | Refills: 0 | Status: SHIPPED | OUTPATIENT
Start: 2023-11-09 | End: 2023-11-16

## 2023-11-09 ASSESSMENT — ENCOUNTER SYMPTOMS
RESPIRATORY NEGATIVE: 1
EYES NEGATIVE: 1
GASTROINTESTINAL NEGATIVE: 1

## 2023-11-09 NOTE — PROGRESS NOTES
Clementine Ramos is a 57 y.o. female evaluated via audio-only technology on 11/9/2023 for Urinary Tract Infection  . Assessment & Plan:   UTI symptoms    Leave urine specimen before starting the macrobid. Will culture if needed    No follow-ups on file. Subjective:   Patient evaluated via a VV. She reports about two days of burning at the end of urinating. Has had one UTI in the past and this feels the same. Denies back pain, denies fevers. Prior to Admission medications    Medication Sig Start Date End Date Taking? Authorizing Provider   nitrofurantoin, macrocrystal-monohydrate, (MACROBID) 100 MG capsule Take 1 capsule by mouth 2 times daily for 7 days 11/9/23 11/16/23 Yes CLINTON Corea NP   ALPRAZolam [place]) 0.25 MG tablet Take 1 tablet by mouth nightly as needed for Sleep or Anxiety for up to 30 days. Max Daily Amount: 0.25 mg 11/7/23 12/7/23 Yes CLINTON Barry NP   CANNABIDIOL PO Take 1 mL by mouth daily   Yes Provider, MD Sarai   calcium carbonate (OSCAL) 500 MG TABS tablet Take 1 tablet by mouth daily   Yes Provider, MD Sarai   vitamin D 25 MCG (1000 UT) CAPS Take 1 capsule by mouth daily   Yes Automatic Reconciliation, Ar   cyanocobalamin 100 MCG tablet Vitamin B12   Yes Automatic Reconciliation, Ar   denosumab (PROLIA) 60 MG/ML SOSY SC injection Inject 1 mL into the skin   Yes Automatic Reconciliation, Ar       Review of Systems   Constitutional: Negative. HENT: Negative. Eyes: Negative. Respiratory: Negative. Cardiovascular: Negative. Gastrointestinal: Negative. Endocrine: Negative. Genitourinary:  Negative for decreased urine volume, dysuria, urgency, vaginal bleeding and vaginal discharge. Burning with urination   Musculoskeletal: Negative. Skin: Negative. Neurological: Negative. No data recorded    Clementine Ramos was evaluated through a patient-initiated, synchronous (real-time) audio only encounter.  She

## 2023-11-11 LAB
BACTERIA SPEC CULT: NORMAL
SERVICE CMNT-IMP: NORMAL

## 2023-12-05 ENCOUNTER — HOSPITAL ENCOUNTER (OUTPATIENT)
Facility: HOSPITAL | Age: 63
Setting detail: INFUSION SERIES
End: 2023-12-05

## 2023-12-17 NOTE — H&P
Hospitalist Admission Note    NAME: Wilfred Treviño   :  1960   MRN:  [id number]     Date/Time:  2022 3:49 AM    Patient PCP: Alexx Barcenas NP  ______________________________________________________________________  Given the patient's current clinical presentation, I have a high level of concern for decompensation if discharged from the emergency department. Complex decision making was performed, which includes reviewing the patient's available past medical records, laboratory results, and x-ray films. Discussed assessment of this patient's clinical condition and plan of care with Dr. Jun Acuna which is as follows. Assessment / Plan:    Acute compression fracture, POA  Fall, POA  Back pain, POA  CT spine lumbar WO contrast: Acute compression deformity without significant canal compromise L2. MRI: pending  - pain management  - Steroid q daily x 5 days  - Neurosurgery consult    Anxiety, POA  - symptom management    Code Status: Full  Surrogate Decision Maker: Gualberto Trimble, sister ([phone])    DVT Prophylaxis: SCD  GI Prophylaxis: not indicated    Baseline: lives alone, independent with ADLs      Subjective:   CHIEF COMPLAINT: fall, back pain    HISTORY OF PRESENT ILLNESS:     Wilfred Treviño is a 59 y.o.  female who presents with back pain s/p fall. She was initially treated at [facility] and was transferred here for further management. As per patient, she was hanging some pictures on the wall above the head of her bead when she lost he balance, feel backwards on her bed and down to the floor landing on her butt. She felt the severe pain right away on her lower mid back that has been constant since and is only eased up with pain medication. On assessment, reports 6-7 mid back pain.  Denies fever, chills, cough, CP, SOB, HUSAIN, dysuria, hematuria, numbness and tingling sensation around her lower back and extremities and any new weakness and incontinence since the event. Past medical history is only significant for anxiety    We were asked to admit for work up and evaluation of the above problems. Past Medical History:   Diagnosis Date    Anxiety     Hypertension         Past Surgical History:   Procedure Laterality Date    HX CYST REMOVAL  1972       Social History     Tobacco Use    Smoking status: Never Smoker    Smokeless tobacco: Never Used   Substance Use Topics    Alcohol use: Yes     Alcohol/week: 3.0 standard drinks     Types: 3 Cans of beer per week        Family History   Problem Relation Age of Onset    Diabetes Mother     Stroke Mother     Cancer Father     Heart Disease Father     Hypertension Father     Diabetes Sister     Hypertension Sister     Heart Disease Brother     Hypertension Brother      Allergies   Allergen Reactions    Sulfa (Sulfonamide Antibiotics) Rash        Prior to Admission medications    Medication Sig Start Date End Date Taking? Authorizing Provider   cannabidiol, CBD, (CANNABIDIOL PO) Take  by mouth. Other, MD Panda   ALPRAZolam Telma Hagan) 0.25 mg tablet  5/18/21   Provider, Historical   cyanocobalamin (VITAMIN B12) 100 mcg tablet Vitamin B12    Provider, Historical   calcium carbonate (CALCIUM 500 PO) Take 500 mg by mouth. Provider, Historical       REVIEW OF SYSTEMS:     I am not able to complete the review of systems because:    The patient is intubated and sedated    The patient has altered mental status due to his acute medical problems    The patient has baseline aphasia from prior stroke(s)    The patient has baseline dementia and is not reliable historian    The patient is in acute medical distress and unable to provide information           Total of 12 systems reviewed as follows:       POSITIVE= bolded text  Negative = text not bolded  General:  fever, chills, sweats, generalized weakness, weight loss/gain,      loss of appetite   Eyes:    blurred vision, eye pain, loss of vision, double vision  ENT:    rhinorrhea, pharyngitis   Respiratory:   cough, sputum production, SOB, HUSAIN, wheezing, pleuritic pain   Cardiology:   chest pain, palpitations, orthopnea, PND, edema, syncope   Gastrointestinal:  abdominal pain , N/V, diarrhea, dysphagia, constipation, bleeding   Genitourinary:  frequency, urgency, dysuria, hematuria, incontinence   Muskuloskeletal :  arthralgia, myalgia, back pain  Hematology:  easy bruising, nose or gum bleeding, lymphadenopathy   Dermatological: rash, ulceration, pruritis, color change / jaundice  Endocrine:   hot flashes or polydipsia   Neurological:  headache, dizziness, confusion, focal weakness, paresthesia,     Speech difficulties, memory loss, gait difficulty  Psychological: Feelings of anxiety, depression, agitation    Objective:   VITALS:    Visit Vitals  BP (!) 108/54   Pulse 87   Temp 97.9 °F (36.6 °C)   Resp 16   SpO2 97%       PHYSICAL EXAM:    General:    Alert, cooperative, no distress, appears stated age. HEENT: Atraumatic, anicteric sclerae, pink conjunctivae     No oral ulcers, mucosa moist, throat clear, dentition fair  Neck:  Supple, symmetrical,  thyroid: non tender  Lungs:   Clear to auscultation bilaterally. No Wheezing or Rhonchi. No rales. Chest wall:  No tenderness  No Accessory muscle use. Heart:   Regular  rhythm,  No  murmur   No edema  Abdomen:   Soft, non-tender. Not distended. Bowel sounds normal  Extremities: No cyanosis. No clubbing,      Skin turgor normal, Capillary refill normal, Radial dial pulse 2+  Skin:     Not pale. Not Jaundiced  No rashes   Psych:  Good insight. Not depressed. Not anxious or agitated. Neurologic: EOMs intact. No facial asymmetry. No aphasia or slurred speech. Symmetrical strength, Sensation grossly intact.  Alert and oriented X 4.     _______________________________________________________________________  Care Plan discussed with:    Comments   Patient y    Family      RN y    Care Manager Consultant:      _______________________________________________________________________  Expected  Disposition:   Home with Family y   HH/PT/OT/RN    SNF/LTC    CHRIS    ________________________________________________________________________        Comments    y Reviewed previous records   >50% of visit spent in counseling and coordination of care y Discussion with patient and/or family and questions answered       ________________________________________________________________________  Signed: Fe Castro NP    Procedures: see electronic medical records for all procedures/Xrays and details which were not copied into this note but were reviewed prior to creation of Plan. LAB DATA REVIEWED:    Recent Results (from the past 24 hour(s))   CBC WITH AUTOMATED DIFF    Collection Time: 02/20/22  4:05 PM   Result Value Ref Range    WBC 8.9 3.6 - 11.0 K/uL    RBC 4.87 3.80 - 5.20 M/uL    HGB 14.8 11.5 - 16.0 g/dL    HCT 44.1 35.0 - 47.0 %    MCV 90.6 80.0 - 99.0 FL    MCH 30.4 26.0 - 34.0 PG    MCHC 33.6 30.0 - 36.5 g/dL    RDW 12.8 11.5 - 14.5 %    PLATELET 683 (L) 969 - 400 K/uL    MPV 12.2 8.9 - 12.9 FL    NRBC 0.0 0  WBC    ABSOLUTE NRBC 0.00 0.00 - 0.01 K/uL    NEUTROPHILS 79 (H) 32 - 75 %    LYMPHOCYTES 15 12 - 49 %    MONOCYTES 5 5 - 13 %    EOSINOPHILS 0 0 - 7 %    BASOPHILS 0 0 - 1 %    IMMATURE GRANULOCYTES 1 (H) 0.0 - 0.5 %    ABS. NEUTROPHILS 7.0 1.8 - 8.0 K/UL    ABS. LYMPHOCYTES 1.3 0.8 - 3.5 K/UL    ABS. MONOCYTES 0.4 0.0 - 1.0 K/UL    ABS. EOSINOPHILS 0.0 0.0 - 0.4 K/UL    ABS. BASOPHILS 0.0 0.0 - 0.1 K/UL    ABS. IMM.  GRANS. 0.1 (H) 0.00 - 0.04 K/UL    DF AUTOMATED     METABOLIC PANEL, BASIC    Collection Time: 02/20/22  4:05 PM   Result Value Ref Range    Sodium 142 136 - 145 mmol/L    Potassium 3.9 3.5 - 5.1 mmol/L    Chloride 104 97 - 108 mmol/L    CO2 28 21 - 32 mmol/L    Anion gap 10 5 - 15 mmol/L    Glucose 110 (H) 65 - 100 mg/dL    BUN 19 6 - 20 MG/DL    Creatinine 0.75 0.55 - 1.02 MG/DL    BUN/Creatinine ratio 25 (H) 12 - 20      GFR est AA >60 >60 ml/min/1.73m2    GFR est non-AA >60 >60 ml/min/1.73m2    Calcium 9.5 8.5 - 10.1 MG/DL There are no Wet Read(s) to document.

## 2023-12-18 ENCOUNTER — HOSPITAL ENCOUNTER (OUTPATIENT)
Facility: HOSPITAL | Age: 63
Setting detail: INFUSION SERIES
Discharge: HOME | End: 2023-12-18
Payer: MEDICARE

## 2023-12-18 VITALS
BODY MASS INDEX: 21.98 KG/M2 | WEIGHT: 136.2 LBS | TEMPERATURE: 98.8 F | RESPIRATION RATE: 17 BRPM | OXYGEN SATURATION: 97 % | DIASTOLIC BLOOD PRESSURE: 64 MMHG | HEART RATE: 76 BPM | SYSTOLIC BLOOD PRESSURE: 136 MMHG

## 2023-12-18 DIAGNOSIS — M81.0 OSTEOPOROSIS, UNSPECIFIED OSTEOPOROSIS TYPE, UNSPECIFIED PATHOLOGICAL FRACTURE PRESENCE: Primary | ICD-10-CM

## 2023-12-18 LAB
CALCIUM SERPL-MCNC: 9.1 MG/DL (ref 8.5–10.1)
MAGNESIUM SERPL-MCNC: 2 MG/DL (ref 1.6–2.4)
PHOSPHATE SERPL-MCNC: 3.6 MG/DL (ref 2.6–4.7)

## 2023-12-18 PROCEDURE — 82310 ASSAY OF CALCIUM: CPT

## 2023-12-18 PROCEDURE — 84100 ASSAY OF PHOSPHORUS: CPT

## 2023-12-18 PROCEDURE — 36415 COLL VENOUS BLD VENIPUNCTURE: CPT

## 2023-12-18 PROCEDURE — 96372 THER/PROPH/DIAG INJ SC/IM: CPT

## 2023-12-18 PROCEDURE — 83735 ASSAY OF MAGNESIUM: CPT

## 2023-12-18 PROCEDURE — 6360000002 HC RX W HCPCS: Performed by: NURSE PRACTITIONER

## 2023-12-18 RX ORDER — ACETAMINOPHEN 325 MG/1
650 TABLET ORAL
OUTPATIENT
Start: 2024-05-07

## 2023-12-18 RX ORDER — SODIUM CHLORIDE 9 MG/ML
INJECTION, SOLUTION INTRAVENOUS CONTINUOUS
OUTPATIENT
Start: 2024-05-07

## 2023-12-18 RX ORDER — ONDANSETRON 2 MG/ML
8 INJECTION INTRAMUSCULAR; INTRAVENOUS
OUTPATIENT
Start: 2024-05-07

## 2023-12-18 RX ORDER — DIPHENHYDRAMINE HYDROCHLORIDE 50 MG/ML
50 INJECTION INTRAMUSCULAR; INTRAVENOUS
OUTPATIENT
Start: 2024-05-07

## 2023-12-18 RX ORDER — EPINEPHRINE 1 MG/ML
0.3 INJECTION, SOLUTION, CONCENTRATE INTRAVENOUS PRN
OUTPATIENT
Start: 2024-05-07

## 2023-12-18 RX ORDER — ALBUTEROL SULFATE 90 UG/1
4 AEROSOL, METERED RESPIRATORY (INHALATION) PRN
OUTPATIENT
Start: 2024-05-07

## 2023-12-18 RX ADMIN — DENOSUMAB 60 MG: 60 INJECTION SUBCUTANEOUS at 12:15

## 2024-05-06 ASSESSMENT — ENCOUNTER SYMPTOMS
ABDOMINAL DISTENTION: 0
CHEST TIGHTNESS: 0
EYE DISCHARGE: 0

## 2024-05-07 ENCOUNTER — OFFICE VISIT (OUTPATIENT)
Age: 64
End: 2024-05-07
Payer: MEDICARE

## 2024-05-07 VITALS
TEMPERATURE: 97 F | WEIGHT: 132 LBS | BODY MASS INDEX: 21.21 KG/M2 | RESPIRATION RATE: 18 BRPM | OXYGEN SATURATION: 98 % | HEIGHT: 66 IN | DIASTOLIC BLOOD PRESSURE: 79 MMHG | HEART RATE: 70 BPM | SYSTOLIC BLOOD PRESSURE: 152 MMHG

## 2024-05-07 DIAGNOSIS — Z12.11 ENCOUNTER FOR SCREENING COLONOSCOPY: ICD-10-CM

## 2024-05-07 DIAGNOSIS — F41.9 ANXIETY: ICD-10-CM

## 2024-05-07 DIAGNOSIS — M81.0 OSTEOPOROSIS, UNSPECIFIED OSTEOPOROSIS TYPE, UNSPECIFIED PATHOLOGICAL FRACTURE PRESENCE: ICD-10-CM

## 2024-05-07 DIAGNOSIS — Z00.00 WELCOME TO MEDICARE PREVENTIVE VISIT: Primary | ICD-10-CM

## 2024-05-07 DIAGNOSIS — R00.2 PALPITATIONS: ICD-10-CM

## 2024-05-07 DIAGNOSIS — E78.00 ELEVATED LDL CHOLESTEROL LEVEL: ICD-10-CM

## 2024-05-07 PROCEDURE — 1123F ACP DISCUSS/DSCN MKR DOCD: CPT | Performed by: NURSE PRACTITIONER

## 2024-05-07 RX ORDER — BUSPIRONE HYDROCHLORIDE 5 MG/1
5 TABLET ORAL 2 TIMES DAILY PRN
Qty: 60 TABLET | Refills: 3 | Status: SHIPPED | OUTPATIENT
Start: 2024-05-07 | End: 2024-09-04

## 2024-05-07 SDOH — ECONOMIC STABILITY: FOOD INSECURITY: WITHIN THE PAST 12 MONTHS, YOU WORRIED THAT YOUR FOOD WOULD RUN OUT BEFORE YOU GOT MONEY TO BUY MORE.: NEVER TRUE

## 2024-05-07 SDOH — ECONOMIC STABILITY: FOOD INSECURITY: WITHIN THE PAST 12 MONTHS, THE FOOD YOU BOUGHT JUST DIDN'T LAST AND YOU DIDN'T HAVE MONEY TO GET MORE.: NEVER TRUE

## 2024-05-07 SDOH — ECONOMIC STABILITY: INCOME INSECURITY: HOW HARD IS IT FOR YOU TO PAY FOR THE VERY BASICS LIKE FOOD, HOUSING, MEDICAL CARE, AND HEATING?: NOT VERY HARD

## 2024-05-07 ASSESSMENT — PATIENT HEALTH QUESTIONNAIRE - PHQ9
SUM OF ALL RESPONSES TO PHQ QUESTIONS 1-9: 6
SUM OF ALL RESPONSES TO PHQ QUESTIONS 1-9: 6
2. FEELING DOWN, DEPRESSED OR HOPELESS: SEVERAL DAYS
1. LITTLE INTEREST OR PLEASURE IN DOING THINGS: NOT AT ALL
3. TROUBLE FALLING OR STAYING ASLEEP: NEARLY EVERY DAY
SUM OF ALL RESPONSES TO PHQ QUESTIONS 1-9: 6
9. THOUGHTS THAT YOU WOULD BE BETTER OFF DEAD, OR OF HURTING YOURSELF: NOT AT ALL
10. IF YOU CHECKED OFF ANY PROBLEMS, HOW DIFFICULT HAVE THESE PROBLEMS MADE IT FOR YOU TO DO YOUR WORK, TAKE CARE OF THINGS AT HOME, OR GET ALONG WITH OTHER PEOPLE: SOMEWHAT DIFFICULT
6. FEELING BAD ABOUT YOURSELF - OR THAT YOU ARE A FAILURE OR HAVE LET YOURSELF OR YOUR FAMILY DOWN: SEVERAL DAYS
SUM OF ALL RESPONSES TO PHQ QUESTIONS 1-9: 6
5. POOR APPETITE OR OVEREATING: NOT AT ALL
SUM OF ALL RESPONSES TO PHQ9 QUESTIONS 1 & 2: 1
4. FEELING TIRED OR HAVING LITTLE ENERGY: SEVERAL DAYS
7. TROUBLE CONCENTRATING ON THINGS, SUCH AS READING THE NEWSPAPER OR WATCHING TELEVISION: NOT AT ALL
8. MOVING OR SPEAKING SO SLOWLY THAT OTHER PEOPLE COULD HAVE NOTICED. OR THE OPPOSITE, BEING SO FIGETY OR RESTLESS THAT YOU HAVE BEEN MOVING AROUND A LOT MORE THAN USUAL: NOT AT ALL

## 2024-05-07 ASSESSMENT — ANXIETY QUESTIONNAIRES
3. WORRYING TOO MUCH ABOUT DIFFERENT THINGS: NOT AT ALL
IF YOU CHECKED OFF ANY PROBLEMS ON THIS QUESTIONNAIRE, HOW DIFFICULT HAVE THESE PROBLEMS MADE IT FOR YOU TO DO YOUR WORK, TAKE CARE OF THINGS AT HOME, OR GET ALONG WITH OTHER PEOPLE: SOMEWHAT DIFFICULT
4. TROUBLE RELAXING: SEVERAL DAYS
6. BECOMING EASILY ANNOYED OR IRRITABLE: MORE THAN HALF THE DAYS
7. FEELING AFRAID AS IF SOMETHING AWFUL MIGHT HAPPEN: NOT AT ALL
1. FEELING NERVOUS, ANXIOUS, OR ON EDGE: SEVERAL DAYS
GAD7 TOTAL SCORE: 7
2. NOT BEING ABLE TO STOP OR CONTROL WORRYING: NEARLY EVERY DAY
5. BEING SO RESTLESS THAT IT IS HARD TO SIT STILL: NOT AT ALL

## 2024-05-07 ASSESSMENT — VISUAL ACUITY
OS_CC: 20/25
OD_CC: 20/25

## 2024-05-07 ASSESSMENT — LIFESTYLE VARIABLES
HOW OFTEN DO YOU HAVE A DRINK CONTAINING ALCOHOL: MONTHLY OR LESS
HOW MANY STANDARD DRINKS CONTAINING ALCOHOL DO YOU HAVE ON A TYPICAL DAY: 1 OR 2

## 2024-05-07 NOTE — PATIENT INSTRUCTIONS
Summary for your review.    Other Preventive Recommendations:    A preventive eye exam performed by an eye specialist is recommended every 1-2 years to screen for glaucoma; cataracts, macular degeneration, and other eye disorders.  A preventive dental visit is recommended every 6 months.  Try to get at least 150 minutes of exercise per week or 10,000 steps per day on a pedometer .  Order or download the FREE \"Exercise & Physical Activity: Your Everyday Guide\" from [employer]. Call 1-297.482.3789 or search [employer] online.  You need 2355-4740 mg of calcium and 1645-2974 IU of vitamin D per day. It is possible to meet your calcium requirement with diet alone, but a vitamin D supplement is usually necessary to meet this goal.  When exposed to the sun, use a sunscreen that protects against both UVA and UVB radiation with an SPF of 30 or greater. Reapply every 2 to 3 hours or after sweating, drying off with a towel, or swimming.  Always wear a seat belt when traveling in a car. Always wear a helmet when riding a bicycle or motorcycle.

## 2024-05-15 PROBLEM — R00.2 PALPITATIONS: Status: ACTIVE | Noted: 2024-05-15

## 2024-05-28 ENCOUNTER — OFFICE VISIT (OUTPATIENT)
Age: 64
End: 2024-05-28
Payer: MEDICARE

## 2024-05-28 VITALS
RESPIRATION RATE: 20 BRPM | SYSTOLIC BLOOD PRESSURE: 122 MMHG | OXYGEN SATURATION: 98 % | HEIGHT: 66 IN | HEART RATE: 68 BPM | DIASTOLIC BLOOD PRESSURE: 84 MMHG | WEIGHT: 133.6 LBS | BODY MASS INDEX: 21.47 KG/M2

## 2024-05-28 VITALS
HEIGHT: 66 IN | HEART RATE: 81 BPM | TEMPERATURE: 96.8 F | DIASTOLIC BLOOD PRESSURE: 78 MMHG | WEIGHT: 133 LBS | BODY MASS INDEX: 21.38 KG/M2 | SYSTOLIC BLOOD PRESSURE: 125 MMHG | OXYGEN SATURATION: 98 % | RESPIRATION RATE: 18 BRPM

## 2024-05-28 DIAGNOSIS — M81.0 OSTEOPOROSIS, UNSPECIFIED OSTEOPOROSIS TYPE, UNSPECIFIED PATHOLOGICAL FRACTURE PRESENCE: ICD-10-CM

## 2024-05-28 DIAGNOSIS — E78.00 ELEVATED LDL CHOLESTEROL LEVEL: ICD-10-CM

## 2024-05-28 DIAGNOSIS — R00.2 PALPITATIONS: ICD-10-CM

## 2024-05-28 DIAGNOSIS — R00.2 PALPITATIONS: Primary | ICD-10-CM

## 2024-05-28 DIAGNOSIS — F41.9 ANXIETY: Primary | ICD-10-CM

## 2024-05-28 PROCEDURE — 99213 OFFICE O/P EST LOW 20 MIN: CPT | Performed by: NURSE PRACTITIONER

## 2024-05-28 PROCEDURE — 99213 OFFICE O/P EST LOW 20 MIN: CPT | Performed by: INTERNAL MEDICINE

## 2024-05-28 PROCEDURE — 1123F ACP DISCUSS/DSCN MKR DOCD: CPT | Performed by: INTERNAL MEDICINE

## 2024-05-28 PROCEDURE — 1123F ACP DISCUSS/DSCN MKR DOCD: CPT | Performed by: NURSE PRACTITIONER

## 2024-05-28 RX ORDER — ESCITALOPRAM OXALATE 5 MG/1
5 TABLET ORAL DAILY
Qty: 90 TABLET | Refills: 1 | Status: SHIPPED | OUTPATIENT
Start: 2024-05-28

## 2024-05-28 ASSESSMENT — ENCOUNTER SYMPTOMS
EYE DISCHARGE: 0
CHEST TIGHTNESS: 0
ABDOMINAL DISTENTION: 0

## 2024-05-28 ASSESSMENT — PATIENT HEALTH QUESTIONNAIRE - PHQ9
SUM OF ALL RESPONSES TO PHQ QUESTIONS 1-9: 0
2. FEELING DOWN, DEPRESSED OR HOPELESS: NOT AT ALL
SUM OF ALL RESPONSES TO PHQ QUESTIONS 1-9: 0
1. LITTLE INTEREST OR PLEASURE IN DOING THINGS: NOT AT ALL
SUM OF ALL RESPONSES TO PHQ9 QUESTIONS 1 & 2: 0

## 2024-05-28 NOTE — PROGRESS NOTES
Heather Smalls is a 63 y.o. female who presents today with c/o   Chief Complaint   Patient presents with    Follow-up     Stopped medication did not like side effects she was having    Anxiety    Other     Labs to be completed as nurse visit. Hx elevated LDL, osteoporosis and palpitations             Assessment/ Plan:       ASSESSMENT AND PLAN    Diagnoses:  1.  Anxiety  Stop buspar--caused s/e  Start lexapro  Follow up in one month--virtual is fine    2. Elevated LDL   Check cholesterol as a nurse visit    3.  Osteoporosis  Check phosphorus and mag as a nurse visit     Continue with prolia  Repeat Dexa due 6/2024    4. Palpitations   Check labs as a nurse visit   Follow up with cardiology as scheduled.      HM:   Colonoscopy:    Colonoscopy: Done 2019, normal. Repeat in 5 years. Referral placed as requested.    No orders of the defined types were placed in this encounter.      Return in about 1 month (around 6/28/2024) for discuss medicine.       Subjective:  Heather Smalls is a 63 y.o. pleasant female here today for follow up on her anxiety.    Palpitations: Followed by cardiology. Still gets palpitations on occasion. TSH, CBC and CMP were unremarkable on 11/2023. EKG on 2/2023 was NSR. She does continue to struggle with anxiety.    passed away in 2019 and she was prescribed xanax 0.5mg as needed, she takes xanax about twice a month. She is looking for a therapist who will take her insurance since she just started on medicare. I gave her a short course of buspar to try, but this made her feel \"dizzy\" and \"off.\" I am going to try her on a low dose escitalopram.    Osteoporosis: She suffered a closed compression fracture of her lumbosacral spine (2/2022) after falling at home. Her dexa scan was completed on 6/2022 with osteopenic change of her lumbar spine and therefore she was started on prolia to help prevent further bone loss. She is tolerating well. She would like us to check her labs for her in

## 2024-05-28 NOTE — PROGRESS NOTES
Chief Complaint   Patient presents with    Other     Bradycardia; SVT     /84 (Site: Left Upper Arm, Position: Sitting, Cuff Size: Medium Adult)   Pulse 68   Resp 20   Ht 1.676 m (5' 6\")   Wt 60.6 kg (133 lb 9.6 oz)   SpO2 98%   BMI 21.56 kg/m²

## 2024-05-28 NOTE — PROGRESS NOTES
\"Have you been to the ER, urgent care clinic since your last visit?  Hospitalized since your last visit?\"    NO    “Have you seen or consulted any other health care providers outside of [facility] since your last visit?”    NO     “Have you had a pap smear?”    NO    No cervical cancer screening on file             Click Here for Release of Records Request

## 2024-06-03 ENCOUNTER — NURSE ONLY (OUTPATIENT)
Age: 64
End: 2024-06-03
Payer: MEDICARE

## 2024-06-03 DIAGNOSIS — E78.00 ELEVATED LDL CHOLESTEROL LEVEL: ICD-10-CM

## 2024-06-03 DIAGNOSIS — R00.2 PALPITATIONS: ICD-10-CM

## 2024-06-03 PROCEDURE — 36415 COLL VENOUS BLD VENIPUNCTURE: CPT | Performed by: NURSE PRACTITIONER

## 2024-06-04 LAB
ALBUMIN SERPL-MCNC: 3.7 G/DL (ref 3.5–5)
ALBUMIN/GLOB SERPL: 1.4 (ref 1.1–2.2)
ALP SERPL-CCNC: 65 U/L (ref 45–117)
ALT SERPL-CCNC: 15 U/L (ref 12–78)
ANION GAP SERPL CALC-SCNC: 2 MMOL/L (ref 5–15)
AST SERPL-CCNC: 17 U/L (ref 15–37)
BASOPHILS # BLD: 0 K/UL (ref 0–0.1)
BASOPHILS NFR BLD: 1 % (ref 0–1)
BILIRUB SERPL-MCNC: 0.7 MG/DL (ref 0.2–1)
BUN SERPL-MCNC: 15 MG/DL (ref 6–20)
BUN/CREAT SERPL: 21 (ref 12–20)
CALCIUM SERPL-MCNC: 9.4 MG/DL (ref 8.5–10.1)
CHLORIDE SERPL-SCNC: 107 MMOL/L (ref 97–108)
CHOLEST SERPL-MCNC: 195 MG/DL
CO2 SERPL-SCNC: 30 MMOL/L (ref 21–32)
CREAT SERPL-MCNC: 0.72 MG/DL (ref 0.55–1.02)
DIFFERENTIAL METHOD BLD: NORMAL
EOSINOPHIL # BLD: 0.1 K/UL (ref 0–0.4)
EOSINOPHIL NFR BLD: 3 % (ref 0–7)
ERYTHROCYTE [DISTWIDTH] IN BLOOD BY AUTOMATED COUNT: 13.4 % (ref 11.5–14.5)
GLOBULIN SER CALC-MCNC: 2.7 G/DL (ref 2–4)
GLUCOSE SERPL-MCNC: 90 MG/DL (ref 65–100)
HCT VFR BLD AUTO: 42.5 % (ref 35–47)
HDLC SERPL-MCNC: 72 MG/DL
HDLC SERPL: 2.7 (ref 0–5)
HGB BLD-MCNC: 14.3 G/DL (ref 11.5–16)
IMM GRANULOCYTES # BLD AUTO: 0 K/UL (ref 0–0.04)
IMM GRANULOCYTES NFR BLD AUTO: 0 % (ref 0–0.5)
LDLC SERPL CALC-MCNC: 107.2 MG/DL (ref 0–100)
LYMPHOCYTES # BLD: 1.4 K/UL (ref 0.8–3.5)
LYMPHOCYTES NFR BLD: 39 % (ref 12–49)
MCH RBC QN AUTO: 30.8 PG (ref 26–34)
MCHC RBC AUTO-ENTMCNC: 33.6 G/DL (ref 30–36.5)
MCV RBC AUTO: 91.6 FL (ref 80–99)
MONOCYTES # BLD: 0.3 K/UL (ref 0–1)
MONOCYTES NFR BLD: 8 % (ref 5–13)
NEUTS SEG # BLD: 1.8 K/UL (ref 1.8–8)
NEUTS SEG NFR BLD: 49 % (ref 32–75)
NRBC # BLD: 0 K/UL (ref 0–0.01)
NRBC BLD-RTO: 0 PER 100 WBC
PLATELET # BLD AUTO: 174 K/UL (ref 150–400)
PMV BLD AUTO: 12.4 FL (ref 8.9–12.9)
POTASSIUM SERPL-SCNC: 4.2 MMOL/L (ref 3.5–5.1)
PROT SERPL-MCNC: 6.4 G/DL (ref 6.4–8.2)
RBC # BLD AUTO: 4.64 M/UL (ref 3.8–5.2)
SODIUM SERPL-SCNC: 139 MMOL/L (ref 136–145)
TRIGL SERPL-MCNC: 79 MG/DL
TSH SERPL DL<=0.05 MIU/L-ACNC: 1.41 UIU/ML (ref 0.36–3.74)
VLDLC SERPL CALC-MCNC: 15.8 MG/DL
WBC # BLD AUTO: 3.7 K/UL (ref 3.6–11)

## 2024-06-07 ENCOUNTER — TELEPHONE (OUTPATIENT)
Age: 64
End: 2024-06-07

## 2024-06-14 ENCOUNTER — TELEPHONE (OUTPATIENT)
Age: 64
End: 2024-06-14

## 2024-06-18 ENCOUNTER — HOSPITAL ENCOUNTER (OUTPATIENT)
Facility: HOSPITAL | Age: 64
Setting detail: INFUSION SERIES
End: 2024-06-18

## 2024-06-20 ENCOUNTER — TELEPHONE (OUTPATIENT)
Age: 64
End: 2024-06-20

## 2024-06-20 NOTE — TELEPHONE ENCOUNTER
Nieves from [facility] requesting new order to be put in for Prolia for this PT.

## 2024-06-25 ENCOUNTER — HOSPITAL ENCOUNTER (OUTPATIENT)
Facility: HOSPITAL | Age: 64
Setting detail: INFUSION SERIES
Discharge: HOME | End: 2024-06-25
Payer: MEDICARE

## 2024-06-25 VITALS
TEMPERATURE: 96.5 F | BODY MASS INDEX: 21.47 KG/M2 | DIASTOLIC BLOOD PRESSURE: 52 MMHG | SYSTOLIC BLOOD PRESSURE: 110 MMHG | WEIGHT: 133 LBS | OXYGEN SATURATION: 98 % | HEART RATE: 58 BPM | RESPIRATION RATE: 16 BRPM

## 2024-06-25 DIAGNOSIS — M81.0 OSTEOPOROSIS, UNSPECIFIED OSTEOPOROSIS TYPE, UNSPECIFIED PATHOLOGICAL FRACTURE PRESENCE: Primary | ICD-10-CM

## 2024-06-25 PROCEDURE — 6360000002 HC RX W HCPCS: Performed by: NURSE PRACTITIONER

## 2024-06-25 PROCEDURE — 96372 THER/PROPH/DIAG INJ SC/IM: CPT

## 2024-06-25 RX ORDER — SODIUM CHLORIDE 9 MG/ML
INJECTION, SOLUTION INTRAVENOUS CONTINUOUS
OUTPATIENT
Start: 2024-12-22

## 2024-06-25 RX ORDER — SODIUM CHLORIDE 9 MG/ML
INJECTION, SOLUTION INTRAVENOUS CONTINUOUS
Status: DISCONTINUED | OUTPATIENT
Start: 2024-06-25 | End: 2024-06-26 | Stop reason: HOSPADM

## 2024-06-25 RX ORDER — EPINEPHRINE 1 MG/ML
0.3 INJECTION, SOLUTION, CONCENTRATE INTRAVENOUS PRN
OUTPATIENT
Start: 2024-12-22

## 2024-06-25 RX ORDER — DIPHENHYDRAMINE HYDROCHLORIDE 50 MG/ML
50 INJECTION INTRAMUSCULAR; INTRAVENOUS
Status: DISCONTINUED | OUTPATIENT
Start: 2024-06-25 | End: 2024-06-26 | Stop reason: HOSPADM

## 2024-06-25 RX ORDER — ONDANSETRON 2 MG/ML
8 INJECTION INTRAMUSCULAR; INTRAVENOUS
Status: DISCONTINUED | OUTPATIENT
Start: 2024-06-25 | End: 2024-06-26 | Stop reason: HOSPADM

## 2024-06-25 RX ORDER — EPINEPHRINE 1 MG/ML
0.3 INJECTION, SOLUTION, CONCENTRATE INTRAVENOUS PRN
Status: DISCONTINUED | OUTPATIENT
Start: 2024-06-25 | End: 2024-06-26 | Stop reason: HOSPADM

## 2024-06-25 RX ORDER — ALBUTEROL SULFATE 90 UG/1
4 AEROSOL, METERED RESPIRATORY (INHALATION) PRN
OUTPATIENT
Start: 2024-12-22

## 2024-06-25 RX ORDER — DIPHENHYDRAMINE HYDROCHLORIDE 50 MG/ML
50 INJECTION INTRAMUSCULAR; INTRAVENOUS
OUTPATIENT
Start: 2024-12-22

## 2024-06-25 RX ORDER — ONDANSETRON 2 MG/ML
8 INJECTION INTRAMUSCULAR; INTRAVENOUS
OUTPATIENT
Start: 2024-12-22

## 2024-06-25 RX ORDER — ACETAMINOPHEN 325 MG/1
650 TABLET ORAL
Status: DISCONTINUED | OUTPATIENT
Start: 2024-06-25 | End: 2024-06-26 | Stop reason: HOSPADM

## 2024-06-25 RX ORDER — ALBUTEROL SULFATE 90 UG/1
4 AEROSOL, METERED RESPIRATORY (INHALATION) PRN
Status: DISCONTINUED | OUTPATIENT
Start: 2024-06-25 | End: 2024-06-26 | Stop reason: HOSPADM

## 2024-06-25 RX ORDER — ACETAMINOPHEN 325 MG/1
650 TABLET ORAL
OUTPATIENT
Start: 2024-12-22

## 2024-06-25 RX ADMIN — DENOSUMAB 60 MG: 60 INJECTION SUBCUTANEOUS at 09:11

## 2024-06-25 NOTE — PROGRESS NOTES
[facility] Progress Note    Date: 2024    Name: Heather Smalls    MRN: [id number]         : 1960      Ms. Smalls was assessed and education was provided.     Ms. Smalls's vitals were reviewed and patient was observed for 5 minutes prior to treatment.   Vitals:    24 0845   BP: (!) 110/52   Pulse: 58   Resp: 16   Temp: (!) 96.5 °F (35.8 °C)   SpO2: 98%     Recent labs reviewed.      Prolia 60 mg  was administered subcutaneous in  right arm. .       Ms. Smalls tolerated well, and had no complaints.  Patient armband removed and shredded    [facility] 0915. She is to return on 2025 at 0900 for her next Prolia appointment. Out of town week next Prolia due.    Angeles Carrillo RN  2024  9:24 AM

## 2024-07-01 ENCOUNTER — TELEMEDICINE (OUTPATIENT)
Age: 64
End: 2024-07-01

## 2024-07-01 DIAGNOSIS — F41.9 ANXIETY: ICD-10-CM

## 2024-07-01 DIAGNOSIS — M81.0 OSTEOPOROSIS, UNSPECIFIED OSTEOPOROSIS TYPE, UNSPECIFIED PATHOLOGICAL FRACTURE PRESENCE: Primary | ICD-10-CM

## 2024-07-01 RX ORDER — ESCITALOPRAM OXALATE 10 MG/1
10 TABLET ORAL DAILY
Qty: 30 TABLET | Refills: 3 | Status: SHIPPED | OUTPATIENT
Start: 2024-07-01

## 2024-07-01 SDOH — ECONOMIC STABILITY: FOOD INSECURITY: WITHIN THE PAST 12 MONTHS, YOU WORRIED THAT YOUR FOOD WOULD RUN OUT BEFORE YOU GOT MONEY TO BUY MORE.: NEVER TRUE

## 2024-07-01 SDOH — ECONOMIC STABILITY: FOOD INSECURITY: WITHIN THE PAST 12 MONTHS, THE FOOD YOU BOUGHT JUST DIDN'T LAST AND YOU DIDN'T HAVE MONEY TO GET MORE.: NEVER TRUE

## 2024-07-01 SDOH — ECONOMIC STABILITY: INCOME INSECURITY: HOW HARD IS IT FOR YOU TO PAY FOR THE VERY BASICS LIKE FOOD, HOUSING, MEDICAL CARE, AND HEATING?: NOT HARD AT ALL

## 2024-07-01 ASSESSMENT — PATIENT HEALTH QUESTIONNAIRE - PHQ9
SUM OF ALL RESPONSES TO PHQ QUESTIONS 1-9: 2
SUM OF ALL RESPONSES TO PHQ9 QUESTIONS 1 & 2: 2
2. FEELING DOWN, DEPRESSED OR HOPELESS: SEVERAL DAYS
1. LITTLE INTEREST OR PLEASURE IN DOING THINGS: SEVERAL DAYS
SUM OF ALL RESPONSES TO PHQ QUESTIONS 1-9: 2

## 2024-07-01 ASSESSMENT — ENCOUNTER SYMPTOMS
EYES NEGATIVE: 1
RESPIRATORY NEGATIVE: 1
GASTROINTESTINAL NEGATIVE: 1

## 2024-07-01 NOTE — PROGRESS NOTES
Chief Complaint   Patient presents with    Medication Check     Pt has not seen much of a change one way or the other after starting Lexapro.        There were no vitals filed for this visit.\"Have you been to the ER, urgent care clinic since your last visit?  Hospitalized since your last visit?\"    NO    “Have you seen or consulted any other health care providers outside of [facility] since your last visit?”    NO     “Have you had a pap smear?”    NO- Crews visit last week but he did not do a PAP because of her age.     No cervical cancer screening on file             Click Here for Release of Records Request  
2 months (around 9/1/2024) for discuss makennaapro.    Heather Smalls, was evaluated through a synchronous (real-time) audio-video encounter. The patient (or guardian if applicable) is aware that this is a billable service, which includes applicable co-pays. This Virtual Visit was conducted with patient's (and/or legal guardian's) consent. Patient identification was verified, and a caregiver was present when appropriate.   The patient was located at Home: [facility] [place] [id number]  Provider was located at Facility (Appt Dept): [address] [place] [phone]  Confirm you are appropriately licensed, registered, or certified to deliver care in the state where the patient is located as indicated above. If you are not or unsure, please re-schedule the visit: Yes, I confirm.       Total time spent on this encounter: Not billed by time    --CLINTON Dyson NP on 7/1/2024 at 11:51 AM    An electronic signature was used to authenticate this note.

## 2024-07-18 ENCOUNTER — HOSPITAL ENCOUNTER (OUTPATIENT)
Facility: HOSPITAL | Age: 64
Discharge: HOME | End: 2024-07-18
Payer: MEDICARE

## 2024-07-18 DIAGNOSIS — M81.0 OSTEOPOROSIS, UNSPECIFIED OSTEOPOROSIS TYPE, UNSPECIFIED PATHOLOGICAL FRACTURE PRESENCE: ICD-10-CM

## 2024-07-18 PROCEDURE — 77080 DXA BONE DENSITY AXIAL: CPT

## 2024-08-05 ENCOUNTER — PREP FOR PROCEDURE (OUTPATIENT)
Age: 64
End: 2024-08-05

## 2024-08-05 ENCOUNTER — OFFICE VISIT (OUTPATIENT)
Age: 64
End: 2024-08-05

## 2024-08-05 VITALS
OXYGEN SATURATION: 96 % | DIASTOLIC BLOOD PRESSURE: 73 MMHG | SYSTOLIC BLOOD PRESSURE: 127 MMHG | RESPIRATION RATE: 18 BRPM | BODY MASS INDEX: 21.38 KG/M2 | HEIGHT: 66 IN | TEMPERATURE: 97.6 F | WEIGHT: 133 LBS | HEART RATE: 60 BPM

## 2024-08-05 DIAGNOSIS — Z83.719 FAMILY HISTORY OF COLONIC POLYPS: Primary | ICD-10-CM

## 2024-08-05 DIAGNOSIS — Z83.719 FAMILY HISTORY OF COLONIC POLYPS: ICD-10-CM

## 2024-08-05 ASSESSMENT — PATIENT HEALTH QUESTIONNAIRE - PHQ9
SUM OF ALL RESPONSES TO PHQ QUESTIONS 1-9: 0
2. FEELING DOWN, DEPRESSED OR HOPELESS: NOT AT ALL
SUM OF ALL RESPONSES TO PHQ QUESTIONS 1-9: 0
SUM OF ALL RESPONSES TO PHQ QUESTIONS 1-9: 0
SUM OF ALL RESPONSES TO PHQ9 QUESTIONS 1 & 2: 0
1. LITTLE INTEREST OR PLEASURE IN DOING THINGS: NOT AT ALL
SUM OF ALL RESPONSES TO PHQ QUESTIONS 1-9: 0

## 2024-08-05 NOTE — PROGRESS NOTES
Heather Smalls is a 63 y.o. female who presents today with the following:  Chief Complaint   Patient presents with    New Patient    Colonoscopy       HPI    63-year-old female who presents as a referral from Radha Gomez for possible colonoscopy.  Her last colonoscopy was in September 2019.  This was performed because of a family history of colon polyps.  No polyps were found in her colonoscopy.  It was described as \"moderately difficult\" and also apparently had a suboptimal prep.  She denies any current history of melena or hematochezia or diarrhea or constipation.  She denies any abdominal pain or unexpected weight loss.  She has had no change in the caliber of her stool and no recent stool testing.    As a teenager she had a pilonidal cyst excision.  She has also had a kyphoplasty.  She apparently had a laparoscopy for endometriosis in the remote past.    She does not smoke.  She may have a drink a few nights a week.    She is not on aspirin any other blood thinners.  Past Medical History:   Diagnosis Date    Anxiety     Hypertension        Past Surgical History:   Procedure Laterality Date    CYST REMOVAL  1972    IR KYPHOPLASTY LUMBAR FIRST LEVEL  02/23/2022    IR KYPHOPLASTY LUMBAR FIRST LEVEL 2/23/2022 [facility] IR    IR KYPHOPLASTY LUMBAR FIRST LEVEL  2/23/2022    TONSILLECTOMY         Social History     Socioeconomic History    Marital status:      Spouse name: Not on file    Number of children: Not on file    Years of education: Not on file    Highest education level: Not on file   Occupational History    Not on file   Tobacco Use    Smoking status: Never     Passive exposure: Never    Smokeless tobacco: Never   Vaping Use    Vaping Use: Never used   Substance and Sexual Activity    Alcohol use: Yes     Alcohol/week: 3.0 standard drinks of alcohol     Types: 3 Glasses of wine per week    Drug use: Never    Sexual activity: Not Currently     Partners: Male   Other Topics Concern    Not on

## 2024-08-05 NOTE — PROGRESS NOTES
Identified pt with two pt identifiers (name and ). Reviewed chart in preparation for visit and have obtained necessary documentation.    Heather Smalls is a 63 y.o. female New Patient and Colonoscopy  .    Vitals:    24 1426   BP: 127/73   Site: Right Upper Arm   Position: Sitting   Cuff Size: Medium Adult   Pulse: 60   Resp: 18   Temp: 97.6 °F (36.4 °C)   TempSrc: Oral   SpO2: 96%   Weight: 60.3 kg (133 lb)   Height: 1.676 m (5' 6\")          1. Have you been to the ER, urgent care clinic since your last visit?  Hospitalized since your last visit?  no     2. Have you seen or consulted any other health care providers outside of the [facility] since your last visit?  Include any pap smears or colon screening.  yes - [facility]

## 2024-08-06 DIAGNOSIS — F41.9 ANXIETY: ICD-10-CM

## 2024-08-06 RX ORDER — ESCITALOPRAM OXALATE 10 MG/1
10 TABLET ORAL DAILY
Qty: 90 TABLET | Refills: 2 | Status: SHIPPED | OUTPATIENT
Start: 2024-08-06

## 2024-09-19 ENCOUNTER — TELEMEDICINE (OUTPATIENT)
Age: 64
End: 2024-09-19

## 2024-09-19 DIAGNOSIS — F41.9 ANXIETY: ICD-10-CM

## 2024-09-19 RX ORDER — ALPRAZOLAM 0.25 MG
0.25 TABLET ORAL NIGHTLY PRN
Qty: 10 TABLET | Refills: 0 | Status: SHIPPED | OUTPATIENT
Start: 2024-09-19 | End: 2024-10-19

## 2024-09-19 ASSESSMENT — ENCOUNTER SYMPTOMS
GASTROINTESTINAL NEGATIVE: 1
EYES NEGATIVE: 1
RESPIRATORY NEGATIVE: 1

## 2024-09-19 ASSESSMENT — PATIENT HEALTH QUESTIONNAIRE - PHQ9
SUM OF ALL RESPONSES TO PHQ QUESTIONS 1-9: 0
SUM OF ALL RESPONSES TO PHQ9 QUESTIONS 1 & 2: 0
5. POOR APPETITE OR OVEREATING: NOT AT ALL
1. LITTLE INTEREST OR PLEASURE IN DOING THINGS: NOT AT ALL
7. TROUBLE CONCENTRATING ON THINGS, SUCH AS READING THE NEWSPAPER OR WATCHING TELEVISION: NOT AT ALL
SUM OF ALL RESPONSES TO PHQ QUESTIONS 1-9: 0
10. IF YOU CHECKED OFF ANY PROBLEMS, HOW DIFFICULT HAVE THESE PROBLEMS MADE IT FOR YOU TO DO YOUR WORK, TAKE CARE OF THINGS AT HOME, OR GET ALONG WITH OTHER PEOPLE: NOT DIFFICULT AT ALL
6. FEELING BAD ABOUT YOURSELF - OR THAT YOU ARE A FAILURE OR HAVE LET YOURSELF OR YOUR FAMILY DOWN: NOT AT ALL
9. THOUGHTS THAT YOU WOULD BE BETTER OFF DEAD, OR OF HURTING YOURSELF: NOT AT ALL
2. FEELING DOWN, DEPRESSED OR HOPELESS: NOT AT ALL
4. FEELING TIRED OR HAVING LITTLE ENERGY: NOT AT ALL
SUM OF ALL RESPONSES TO PHQ QUESTIONS 1-9: 0
3. TROUBLE FALLING OR STAYING ASLEEP: NOT AT ALL
SUM OF ALL RESPONSES TO PHQ QUESTIONS 1-9: 0
8. MOVING OR SPEAKING SO SLOWLY THAT OTHER PEOPLE COULD HAVE NOTICED. OR THE OPPOSITE, BEING SO FIGETY OR RESTLESS THAT YOU HAVE BEEN MOVING AROUND A LOT MORE THAN USUAL: NOT AT ALL

## 2024-10-07 ENCOUNTER — ANESTHESIA EVENT (OUTPATIENT)
Facility: HOSPITAL | Age: 64
End: 2024-10-07
Payer: MEDICARE

## 2024-10-07 NOTE — ANESTHESIA PRE PROCEDURE
Department of Anesthesiology  Preprocedure Note       Name:  Heather Smalls   Age:  63 y.o.  :  1960                                          MRN:  [id number]         Date:  10/7/2024      Surgeon: Surgeon(s):  Simran Magaña MD    Procedure: Procedure(s):  COLONOSCOPY    Medications prior to admission:   Prior to Admission medications    Medication Sig Start Date End Date Taking? Authorizing Provider   ALPRAZolam (XANAX) 0.25 MG tablet Take 1 tablet by mouth nightly as needed for Sleep or Anxiety for up to 30 days. Max Daily Amount: 0.25 mg 9/19/24 10/19/24  Radha Gomez, CLINTON - NP   escitalopram (LEXAPRO) 10 MG tablet TAKE 1 TABLET BY MOUTH EVERY DAY 24   Radha Gomez, CLINTON English NP   CANNABIDIOL PO Take 1 mL by mouth daily    Provider, MD Sarai   calcium carbonate (OSCAL) 500 MG TABS tablet Take 1 tablet by mouth daily    ProviderSarai MD   vitamin D 25 MCG (1000 UT) CAPS Take 1 capsule by mouth daily    Automatic Reconciliation, Ar   cyanocobalamin 100 MCG tablet Vitamin B12    Automatic Reconciliation, Ar   denosumab (PROLIA) 60 MG/ML SOSY SC injection Inject 1 mL into the skin    Automatic Reconciliation, Ar       Current medications:    No current facility-administered medications for this encounter.     Current Outpatient Medications   Medication Sig Dispense Refill   • ALPRAZolam (XANAX) 0.25 MG tablet Take 1 tablet by mouth nightly as needed for Sleep or Anxiety for up to 30 days. Max Daily Amount: 0.25 mg 10 tablet 0   • escitalopram (LEXAPRO) 10 MG tablet TAKE 1 TABLET BY MOUTH EVERY DAY 90 tablet 2   • CANNABIDIOL PO Take 1 mL by mouth daily     • calcium carbonate (OSCAL) 500 MG TABS tablet Take 1 tablet by mouth daily     • vitamin D 25 MCG (1000 UT) CAPS Take 1 capsule by mouth daily     • cyanocobalamin 100 MCG tablet Vitamin B12     • denosumab (PROLIA) 60 MG/ML SOSY SC injection Inject 1 mL into the skin         Allergies:    Allergies   Allergen Reactions

## 2024-10-17 NOTE — PERIOP NOTE
[facility]  SURGICAL PRE-ADMISSION INSTRUCTIONS    ARRIVAL  You will be called the day before your surgery with your expected arrival time.  Sign in at the  of the hospital.  You will be directed to the Surgical Waiting Room.  Please arrive at your scheduled appointment time.  You have been scheduled to arrive for your procedure one or two hours prior to the expected start time of your procedure.  Every effort will be made to minimize your wait but please be aware that unforeseen circumstances may affect our schedule.    EATING  DO NOT EAT OR DRINK ANYTHING AFTER MIDNIGHT ON THE EVENING BEFORE YOUR SURGERY OR ON THE DAY OF YOUR SURGERY except for your medications (as instructed) with a sip of water.  Do not use gum, mints or lozenges on the morning of your surgery.  Please do not smoke or chew tobacco before your surgery.    MEDICATIONS   Take the following medications on the morning of your surgery with the smallest amount of water possible : none    STOP THESE MEDICATIONS AT THE TIMES LISTED BELOW  NSAIDS (Indocin, Ibuprofen, Naprosyn) ;  7 days before     DRIVING/TRANSPORATION  Have a responsible adult to drive you home from the hospital and to stay with you over night.  Please have them plan to remain in the hospital during your surgery.  Your surgery will not be done if you do not have a responsible adult to take you home and to stay with you.  If you have arranged for public transport, you must have a responsible adult to ride with you (who is not the ).  You may not drive for 24 hours after anesthesia.    PREPARATION  If you have a Living WiIl/Advance Directive, please bring a copy with you to scan into your chart.   Please DO NOT wear makeup or nail polish  Please leave valuables at home,  DO NOT wear jewelry.  Wear loose, comfortable clothing that is large enough to cover a bulky dressing.    SPECIAL INSTRUCTIONS:  Follow your surgeon's instructions for  preoperative bowel prep.    Reviewed above preoperative instructions and answered questions by phone interview    Patient:  Heather Smalls   Date:     October 17, 2024  Time:   1:40 PM    RN:  Patria Angulo RN    Date:     October 17, 2024  Time:   1:40 PM

## 2024-10-24 ENCOUNTER — PREP FOR PROCEDURE (OUTPATIENT)
Age: 64
End: 2024-10-24

## 2024-10-24 RX ORDER — SODIUM CHLORIDE 9 MG/ML
INJECTION, SOLUTION INTRAVENOUS PRN
Status: CANCELLED | OUTPATIENT
Start: 2024-10-24

## 2024-10-24 RX ORDER — SODIUM CHLORIDE 0.9 % (FLUSH) 0.9 %
5-40 SYRINGE (ML) INJECTION PRN
Status: CANCELLED | OUTPATIENT
Start: 2024-10-24

## 2024-10-24 RX ORDER — SODIUM CHLORIDE, SODIUM LACTATE, POTASSIUM CHLORIDE, CALCIUM CHLORIDE 600; 310; 30; 20 MG/100ML; MG/100ML; MG/100ML; MG/100ML
INJECTION, SOLUTION INTRAVENOUS CONTINUOUS
Status: CANCELLED | OUTPATIENT
Start: 2024-10-24

## 2024-10-24 RX ORDER — SODIUM CHLORIDE 0.9 % (FLUSH) 0.9 %
5-40 SYRINGE (ML) INJECTION EVERY 12 HOURS SCHEDULED
Status: CANCELLED | OUTPATIENT
Start: 2024-10-24

## 2024-10-24 NOTE — H&P
HPI    63-year-old female who presents as a referral from Radha Gomez for possible colonoscopy.  Her last colonoscopy was in September 2019.  This was performed because of a family history of colon polyps.  No polyps were found in her colonoscopy.  It was described as \"moderately difficult\" and also apparently had a suboptimal prep.  She denies any current history of melena or hematochezia or diarrhea or constipation.  She denies any abdominal pain or unexpected weight loss.  She has had no change in the caliber of her stool and no recent stool testing.    As a teenager she had a pilonidal cyst excision.  She has also had a kyphoplasty.  She apparently had a laparoscopy for endometriosis in the remote past.    She does not smoke.  She may have a drink a few nights a week.    She is not on aspirin any other blood thinners.  Past Medical History:   Diagnosis Date    Anxiety     Hypertension        Past Surgical History:   Procedure Laterality Date    CYST REMOVAL  1972    IR KYPHOPLASTY LUMBAR FIRST LEVEL  02/23/2022    IR KYPHOPLASTY LUMBAR FIRST LEVEL 2/23/2022 [facility] RAD ANGIO IR    IR KYPHOPLASTY LUMBAR FIRST LEVEL  2/23/2022    TONSILLECTOMY         Social History     Socioeconomic History    Marital status:      Spouse name: Not on file    Number of children: Not on file    Years of education: Not on file    Highest education level: Not on file   Occupational History    Not on file   Tobacco Use    Smoking status: Never     Passive exposure: Never    Smokeless tobacco: Never   Vaping Use    Vaping status: Never Used   Substance and Sexual Activity    Alcohol use: Yes     Alcohol/week: 3.0 standard drinks of alcohol     Types: 3 Glasses of wine per week    Drug use: Never    Sexual activity: Not Currently     Partners: Male   Other Topics Concern    Not on file   Social History Narrative    Not on file     Social Determinants of Health     Financial Resource Strain: Low Risk  (7/1/2024)    Overall

## 2024-10-24 NOTE — H&P (VIEW-ONLY)
HPI    63-year-old female who presents as a referral from Radha Gomez for possible colonoscopy.  Her last colonoscopy was in September 2019.  This was performed because of a family history of colon polyps.  No polyps were found in her colonoscopy.  It was described as \"moderately difficult\" and also apparently had a suboptimal prep.  She denies any current history of melena or hematochezia or diarrhea or constipation.  She denies any abdominal pain or unexpected weight loss.  She has had no change in the caliber of her stool and no recent stool testing.    As a teenager she had a pilonidal cyst excision.  She has also had a kyphoplasty.  She apparently had a laparoscopy for endometriosis in the remote past.    She does not smoke.  She may have a drink a few nights a week.    She is not on aspirin any other blood thinners.  Past Medical History:   Diagnosis Date    Anxiety     Hypertension        Past Surgical History:   Procedure Laterality Date    CYST REMOVAL  1972    IR KYPHOPLASTY LUMBAR FIRST LEVEL  02/23/2022    IR KYPHOPLASTY LUMBAR FIRST LEVEL 2/23/2022 [facility] IR    IR KYPHOPLASTY LUMBAR FIRST LEVEL  2/23/2022    TONSILLECTOMY         Social History     Socioeconomic History    Marital status:      Spouse name: Not on file    Number of children: Not on file    Years of education: Not on file    Highest education level: Not on file   Occupational History    Not on file   Tobacco Use    Smoking status: Never     Passive exposure: Never    Smokeless tobacco: Never   Vaping Use    Vaping status: Never Used   Substance and Sexual Activity    Alcohol use: Yes     Alcohol/week: 3.0 standard drinks of alcohol     Types: 3 Glasses of wine per week    Drug use: Never    Sexual activity: Not Currently     Partners: Male   Other Topics Concern    Not on file   Social History Narrative    Not on file     Social Determinants of Health     Financial Resource Strain: Low Risk  (7/1/2024)    Overall  1 TABLET BY MOUTH EVERY DAY 90 tablet 2    CANNABIDIOL PO Take 1 mL by mouth daily      calcium carbonate (OSCAL) 500 MG TABS tablet Take 1 tablet by mouth daily      vitamin D 25 MCG (1000 UT) CAPS Take 1 capsule by mouth daily      cyanocobalamin 100 MCG tablet Vitamin B12      denosumab (PROLIA) 60 MG/ML SOSY SC injection Inject 1 mL into the skin       No current facility-administered medications for this visit.       The above histories, medications and allergies have been reviewed.    Review of Systems      There were no vitals taken for this visit.  Physical Exam  Constitutional:       Appearance: Normal appearance.   Cardiovascular:      Rate and Rhythm: Normal rate and regular rhythm.   Pulmonary:      Effort: No respiratory distress.      Breath sounds: Normal breath sounds. No stridor.   Abdominal:      General: There is no distension.      Palpations: Abdomen is soft. There is no mass.      Tenderness: There is no abdominal tenderness.   Neurological:      Mental Status: She is alert.          1. Family history of colonic polyps  Recommend colonoscopy.  The procedure was explained in detail including the risks and benefits.  Risks shared included risks of missed lesions, incomplete exam, colon injury or perforation.   Risks associated with anesthesia were also discussed.  The patient wishes to proceed and we will schedule.  We will request a pediatric colonoscope based on her body habitus.    The patient was counseled at length about the risks of tariq Covid-19 during their perioperative period and any recovery window from their procedure.  The patient was made aware that tariq Covid-19  may worsen their prognosis for recovering from their procedure  and lend to a higher morbidity and/or mortality risk.  All material risks, benefits, and reasonable alternatives including postponing the procedure were discussed. The patient does wish to proceed with the procedure at this time.         No

## 2024-10-25 ENCOUNTER — HOSPITAL ENCOUNTER (OUTPATIENT)
Facility: HOSPITAL | Age: 64
Setting detail: OUTPATIENT SURGERY
Discharge: HOME | End: 2024-10-25
Attending: SURGERY | Admitting: SURGERY
Payer: MEDICARE

## 2024-10-25 ENCOUNTER — ANESTHESIA (OUTPATIENT)
Facility: HOSPITAL | Age: 64
End: 2024-10-25
Payer: MEDICARE

## 2024-10-25 VITALS
HEART RATE: 60 BPM | HEIGHT: 66 IN | RESPIRATION RATE: 15 BRPM | TEMPERATURE: 97.6 F | WEIGHT: 133 LBS | OXYGEN SATURATION: 99 % | BODY MASS INDEX: 21.38 KG/M2 | DIASTOLIC BLOOD PRESSURE: 57 MMHG | SYSTOLIC BLOOD PRESSURE: 116 MMHG

## 2024-10-25 PROCEDURE — 3700000001 HC ADD 15 MINUTES (ANESTHESIA): Performed by: SURGERY

## 2024-10-25 PROCEDURE — 7100000000 HC PACU RECOVERY - FIRST 15 MIN: Performed by: SURGERY

## 2024-10-25 PROCEDURE — G0105 COLORECTAL SCRN; HI RISK IND: HCPCS | Performed by: SURGERY

## 2024-10-25 PROCEDURE — 6360000002 HC RX W HCPCS: Performed by: ANESTHESIOLOGY

## 2024-10-25 PROCEDURE — 3700000000 HC ANESTHESIA ATTENDED CARE: Performed by: SURGERY

## 2024-10-25 PROCEDURE — 7100000001 HC PACU RECOVERY - ADDTL 15 MIN: Performed by: SURGERY

## 2024-10-25 PROCEDURE — 2580000003 HC RX 258: Performed by: SURGERY

## 2024-10-25 PROCEDURE — 2709999900 HC NON-CHARGEABLE SUPPLY: Performed by: SURGERY

## 2024-10-25 PROCEDURE — 2500000003 HC RX 250 WO HCPCS: Performed by: ANESTHESIOLOGY

## 2024-10-25 PROCEDURE — 3600000012 HC SURGERY LEVEL 2 ADDTL 15MIN: Performed by: SURGERY

## 2024-10-25 PROCEDURE — 3600000002 HC SURGERY LEVEL 2 BASE: Performed by: SURGERY

## 2024-10-25 RX ORDER — SODIUM CHLORIDE 0.9 % (FLUSH) 0.9 %
5-40 SYRINGE (ML) INJECTION PRN
Status: DISCONTINUED | OUTPATIENT
Start: 2024-10-25 | End: 2024-10-25 | Stop reason: HOSPADM

## 2024-10-25 RX ORDER — SODIUM CHLORIDE 9 MG/ML
INJECTION, SOLUTION INTRAVENOUS PRN
Status: DISCONTINUED | OUTPATIENT
Start: 2024-10-25 | End: 2024-10-25 | Stop reason: HOSPADM

## 2024-10-25 RX ORDER — NALOXONE HYDROCHLORIDE 0.4 MG/ML
INJECTION, SOLUTION INTRAMUSCULAR; INTRAVENOUS; SUBCUTANEOUS PRN
Status: DISCONTINUED | OUTPATIENT
Start: 2024-10-25 | End: 2024-10-25 | Stop reason: HOSPADM

## 2024-10-25 RX ORDER — SODIUM CHLORIDE 0.9 % (FLUSH) 0.9 %
5-40 SYRINGE (ML) INJECTION EVERY 12 HOURS SCHEDULED
Status: DISCONTINUED | OUTPATIENT
Start: 2024-10-25 | End: 2024-10-25 | Stop reason: HOSPADM

## 2024-10-25 RX ORDER — PROPOFOL 10 MG/ML
INJECTION, EMULSION INTRAVENOUS
Status: DISCONTINUED | OUTPATIENT
Start: 2024-10-25 | End: 2024-10-25 | Stop reason: SDUPTHER

## 2024-10-25 RX ORDER — SODIUM CHLORIDE, SODIUM LACTATE, POTASSIUM CHLORIDE, CALCIUM CHLORIDE 600; 310; 30; 20 MG/100ML; MG/100ML; MG/100ML; MG/100ML
INJECTION, SOLUTION INTRAVENOUS CONTINUOUS
Status: DISCONTINUED | OUTPATIENT
Start: 2024-10-25 | End: 2024-10-25 | Stop reason: HOSPADM

## 2024-10-25 RX ORDER — LIDOCAINE HYDROCHLORIDE 20 MG/ML
INJECTION, SOLUTION EPIDURAL; INFILTRATION; INTRACAUDAL; PERINEURAL
Status: DISCONTINUED | OUTPATIENT
Start: 2024-10-25 | End: 2024-10-25 | Stop reason: SDUPTHER

## 2024-10-25 RX ADMIN — PROPOFOL 100 MG: 10 INJECTION, EMULSION INTRAVENOUS at 09:45

## 2024-10-25 RX ADMIN — PROPOFOL 100 MG: 10 INJECTION, EMULSION INTRAVENOUS at 09:32

## 2024-10-25 RX ADMIN — LIDOCAINE HYDROCHLORIDE 100 MG: 20 INJECTION, SOLUTION EPIDURAL; INFILTRATION; INTRACAUDAL; PERINEURAL at 09:32

## 2024-10-25 RX ADMIN — PROPOFOL 100 MG: 10 INJECTION, EMULSION INTRAVENOUS at 09:54

## 2024-10-25 RX ADMIN — SODIUM CHLORIDE, POTASSIUM CHLORIDE, SODIUM LACTATE AND CALCIUM CHLORIDE: 600; 310; 30; 20 INJECTION, SOLUTION INTRAVENOUS at 08:57

## 2024-10-25 ASSESSMENT — PAIN - FUNCTIONAL ASSESSMENT: PAIN_FUNCTIONAL_ASSESSMENT: NONE - DENIES PAIN

## 2024-10-25 NOTE — BRIEF OP NOTE
Brief Postoperative Note      Patient: Heather Smalls  YOB: 1960  MRN: [id number]    Date of Procedure: 10/25/2024    Pre-Op Diagnosis Codes:      * Family history of colonic polyps [Z83.719]    Post-Op Diagnosis: Same       Procedure(s):  COLONOSCOPY    Surgeon(s):  Simran Magaña MD    Assistant:  * No surgical staff found *    Anesthesia: Monitor Anesthesia Care    Estimated Blood Loss (mL): 0    Complications: None    Specimens:   * No specimens in log *    Implants:  * No implants in log *      Drains: * No LDAs found *    Findings:  Infection Present At Time Of Surgery (PATOS) (choose all levels that have infection present):  No infection present  Other Findings:   1. Diverticulosis  2. Internal hemorrhoids      Electronically signed by Simran Magaña MD on 10/25/2024 at 10:07 AM

## 2024-10-25 NOTE — INTERVAL H&P NOTE
Update History & Physical    The patient's History and Physical of October 24, 2024 was reviewed with the patient and I examined the patient. There was no change. The surgical site was confirmed by the patient and me.     Plan: The risks, benefits, expected outcome, and alternative to the recommended procedure have been discussed with the patient. Patient understands and wants to proceed with the procedure.     Electronically signed by Simran Magaña MD on 10/25/2024 at 9:26 AM

## 2024-10-25 NOTE — ANESTHESIA POSTPROCEDURE EVALUATION
Department of Anesthesiology  Postprocedure Note    Patient: Heather Smalls  MRN: [id number]  YOB: 1960  Date of evaluation: 10/25/2024    Procedure Summary       Date: 10/25/24 Room / Location: [facility] MAIN OR [address] [place] [address] MAIN OR    Anesthesia Start: 0929 Anesthesia Stop: 1008    Procedure: COLONOSCOPY (Lower GI Region) Diagnosis:       Family history of colonic polyps      (Family history of colonic polyps [Z83.719])    Surgeons: Simran Magaña MD Responsible Provider: Emily Palma MD    Anesthesia Type: MAC ASA Status: 2            Anesthesia Type: No value filed.    Nabila Phase I: Nabila Score: 0    Nabila Phase II:      Anesthesia Post Evaluation    Patient location during evaluation: bedside  Patient participation: complete - patient participated  Level of consciousness: awake and alert  Pain score: 0  Airway patency: patent  Nausea & Vomiting: no nausea  Cardiovascular status: hemodynamically stable  Respiratory status: acceptable  Hydration status: stable  Pain management: adequate    No notable events documented.

## 2024-10-25 NOTE — OP NOTE
[address]  [address]                            OPERATIVE REPORT      PATIENT NAME: BG RTINH             : 1960  MED REC NO: [id number]                       ROOM: OR  ACCOUNT NO: [id number]                       ADMIT DATE: 10/25/2024  PROVIDER: Simran Magaña MD    DATE OF SERVICE:  10/25/2024    PREOPERATIVE DIAGNOSES:  Family history of colon polyps.    POSTOPERATIVE DIAGNOSES:  Family history of colon polyps.    PROCEDURES PERFORMED:  Colonoscopy.    SURGEON:  Simran Magaña MD    ASSISTANT:  None.    ANESTHESIA:  MAC.    ESTIMATED BLOOD LOSS:  Zero.    SPECIMENS REMOVED:  None.    INTRAOPERATIVE FINDINGS:       1. Diverticulosis.     2. Internal hemorrhoids.     COMPLICATIONS:  None.    IMPLANTS:  None.    INDICATIONS:  ***    DESCRIPTION OF PROCEDURE:  The patient was brought to the operative theater.  Monitoring devices and nasal cannula O2 were placed per Anesthesia.  The patient was placed in a left-side-down decubitus position.  IV sedation was administered by Anesthesia and a time-out was then performed.  Digital rectal exam was performed, which was essentially normal.  A well-lubricated Olympus pediatric colonoscope was introduced into the patient's rectum and advanced to the cecum.  We did have to apply gentle abdominal pressure and eventually changed to the supine position, but the cecum was successfully cannulated.  We identified the cecum by the ileocecal valve and the appendiceal orifice, which were photographed.  The prep was adequate to proceed.  The scope was carefully withdrawn with mucosal surfaces circumferentially evaluated.  No significant abnormalities were noted in the cecum or ascending colon.  Transverse colon appeared to be free of disease to the descending colon.  Sigmoid colon showed diverticulosis.  The scope was pulled back into the rectum and retroflexed, which revealed internal

## 2024-12-18 DIAGNOSIS — F41.9 ANXIETY: ICD-10-CM

## 2024-12-18 RX ORDER — ESCITALOPRAM OXALATE 5 MG/1
5 TABLET ORAL DAILY
Qty: 90 TABLET | Refills: 1 | OUTPATIENT
Start: 2024-12-18

## 2024-12-18 RX ORDER — ESCITALOPRAM OXALATE 10 MG/1
10 TABLET ORAL DAILY
Qty: 90 TABLET | Refills: 2 | Status: SHIPPED | OUTPATIENT
Start: 2024-12-18

## 2024-12-31 ENCOUNTER — HOSPITAL ENCOUNTER (OUTPATIENT)
Facility: HOSPITAL | Age: 64
Setting detail: INFUSION SERIES
End: 2024-12-31

## 2025-01-06 ENCOUNTER — HOSPITAL ENCOUNTER (OUTPATIENT)
Facility: HOSPITAL | Age: 65
Setting detail: INFUSION SERIES
End: 2025-01-06

## 2025-01-09 ENCOUNTER — HOSPITAL ENCOUNTER (OUTPATIENT)
Facility: HOSPITAL | Age: 65
Setting detail: INFUSION SERIES
Discharge: HOME | End: 2025-01-09
Payer: MEDICARE

## 2025-01-09 VITALS
OXYGEN SATURATION: 97 % | RESPIRATION RATE: 17 BRPM | HEART RATE: 65 BPM | DIASTOLIC BLOOD PRESSURE: 76 MMHG | TEMPERATURE: 97.9 F | WEIGHT: 137.8 LBS | BODY MASS INDEX: 22.24 KG/M2 | SYSTOLIC BLOOD PRESSURE: 138 MMHG

## 2025-01-09 DIAGNOSIS — M81.0 OSTEOPOROSIS, UNSPECIFIED OSTEOPOROSIS TYPE, UNSPECIFIED PATHOLOGICAL FRACTURE PRESENCE: Primary | ICD-10-CM

## 2025-01-09 LAB
ALBUMIN SERPL-MCNC: 3.6 G/DL (ref 3.5–5)
ALBUMIN/GLOB SERPL: 1.1 (ref 1.1–2.2)
ALP SERPL-CCNC: 93 U/L (ref 45–117)
ALT SERPL-CCNC: 16 U/L (ref 12–78)
ANION GAP SERPL CALC-SCNC: 6 MMOL/L (ref 2–12)
AST SERPL-CCNC: 17 U/L (ref 15–37)
BILIRUB SERPL-MCNC: 0.5 MG/DL (ref 0.2–1)
BUN SERPL-MCNC: 25 MG/DL (ref 6–20)
BUN/CREAT SERPL: 32 (ref 12–20)
CALCIUM SERPL-MCNC: 9.5 MG/DL (ref 8.5–10.1)
CHLORIDE SERPL-SCNC: 102 MMOL/L (ref 97–108)
CO2 SERPL-SCNC: 32 MMOL/L (ref 21–32)
CREAT SERPL-MCNC: 0.79 MG/DL (ref 0.55–1.02)
GLOBULIN SER CALC-MCNC: 3.4 G/DL (ref 2–4)
GLUCOSE SERPL-MCNC: 99 MG/DL (ref 65–100)
PHOSPHATE SERPL-MCNC: 4.4 MG/DL (ref 2.6–4.7)
POTASSIUM SERPL-SCNC: 4.8 MMOL/L (ref 3.5–5.1)
PROT SERPL-MCNC: 7 G/DL (ref 6.4–8.2)
SODIUM SERPL-SCNC: 140 MMOL/L (ref 136–145)

## 2025-01-09 PROCEDURE — 84100 ASSAY OF PHOSPHORUS: CPT

## 2025-01-09 PROCEDURE — 96372 THER/PROPH/DIAG INJ SC/IM: CPT

## 2025-01-09 PROCEDURE — 80053 COMPREHEN METABOLIC PANEL: CPT

## 2025-01-09 PROCEDURE — 6360000002 HC RX W HCPCS: Performed by: NURSE PRACTITIONER

## 2025-01-09 PROCEDURE — 36415 COLL VENOUS BLD VENIPUNCTURE: CPT

## 2025-01-09 RX ORDER — SODIUM CHLORIDE 9 MG/ML
INJECTION, SOLUTION INTRAVENOUS CONTINUOUS
OUTPATIENT
Start: 2025-06-22

## 2025-01-09 RX ORDER — ONDANSETRON 2 MG/ML
8 INJECTION INTRAMUSCULAR; INTRAVENOUS
OUTPATIENT
Start: 2025-06-22

## 2025-01-09 RX ORDER — HYDROCORTISONE SODIUM SUCCINATE 100 MG/2ML
100 INJECTION INTRAMUSCULAR; INTRAVENOUS
Status: DISCONTINUED | OUTPATIENT
Start: 2025-01-09 | End: 2025-01-10 | Stop reason: HOSPADM

## 2025-01-09 RX ORDER — EPINEPHRINE 1 MG/ML
0.3 INJECTION, SOLUTION, CONCENTRATE INTRAVENOUS PRN
Status: DISCONTINUED | OUTPATIENT
Start: 2025-01-09 | End: 2025-01-10 | Stop reason: HOSPADM

## 2025-01-09 RX ORDER — DIPHENHYDRAMINE HYDROCHLORIDE 50 MG/ML
50 INJECTION INTRAMUSCULAR; INTRAVENOUS
Status: DISCONTINUED | OUTPATIENT
Start: 2025-01-09 | End: 2025-01-10 | Stop reason: HOSPADM

## 2025-01-09 RX ORDER — SODIUM CHLORIDE 9 MG/ML
INJECTION, SOLUTION INTRAVENOUS CONTINUOUS
Status: DISCONTINUED | OUTPATIENT
Start: 2025-01-09 | End: 2025-01-10 | Stop reason: HOSPADM

## 2025-01-09 RX ORDER — ONDANSETRON 2 MG/ML
8 INJECTION INTRAMUSCULAR; INTRAVENOUS
Status: DISCONTINUED | OUTPATIENT
Start: 2025-01-09 | End: 2025-01-10 | Stop reason: HOSPADM

## 2025-01-09 RX ORDER — ACETAMINOPHEN 325 MG/1
650 TABLET ORAL
OUTPATIENT
Start: 2025-06-22

## 2025-01-09 RX ORDER — ALBUTEROL SULFATE 90 UG/1
4 INHALANT RESPIRATORY (INHALATION) PRN
Status: DISCONTINUED | OUTPATIENT
Start: 2025-01-09 | End: 2025-01-10 | Stop reason: HOSPADM

## 2025-01-09 RX ORDER — EPINEPHRINE 1 MG/ML
0.3 INJECTION, SOLUTION, CONCENTRATE INTRAVENOUS PRN
OUTPATIENT
Start: 2025-06-22

## 2025-01-09 RX ORDER — ALBUTEROL SULFATE 90 UG/1
4 INHALANT RESPIRATORY (INHALATION) PRN
OUTPATIENT
Start: 2025-06-22

## 2025-01-09 RX ORDER — DIPHENHYDRAMINE HYDROCHLORIDE 50 MG/ML
50 INJECTION INTRAMUSCULAR; INTRAVENOUS
OUTPATIENT
Start: 2025-06-22

## 2025-01-09 RX ORDER — ACETAMINOPHEN 325 MG/1
650 TABLET ORAL
Status: DISCONTINUED | OUTPATIENT
Start: 2025-01-09 | End: 2025-01-10 | Stop reason: HOSPADM

## 2025-01-09 RX ORDER — HYDROCORTISONE SODIUM SUCCINATE 100 MG/2ML
100 INJECTION INTRAMUSCULAR; INTRAVENOUS
OUTPATIENT
Start: 2025-06-22

## 2025-01-09 RX ADMIN — DENOSUMAB 60 MG: 60 INJECTION SUBCUTANEOUS at 12:57

## 2025-01-09 NOTE — PROGRESS NOTES
[facility] Progress Note    Date: 2025    Name: Heather Smalls    MRN: [id number]         : 1960      Ms. Smalls was assessed and education was provided. Labs drawn peripherally and in process.    Ms. Smalls's vitals were reviewed and patient was observed for 5 minutes prior to treatment.   Vitals:    25 1105   BP: 138/76   Pulse: 65   Resp: 17   Temp: 97.9 °F (36.6 °C)   SpO2: 97%       Lab results were obtained and reviewed.  Recent Results (from the past 12 hour(s))   Comprehensive Metabolic Panel    Collection Time: 25 11:05 AM   Result Value Ref Range    Sodium 140 136 - 145 mmol/L    Potassium 4.8 3.5 - 5.1 mmol/L    Chloride 102 97 - 108 mmol/L    CO2 32 21 - 32 mmol/L    Anion Gap 6 2 - 12 mmol/L    Glucose 99 65 - 100 mg/dL    BUN 25 (H) 6 - 20 MG/DL    Creatinine 0.79 0.55 - 1.02 MG/DL    BUN/Creatinine Ratio 32 (H) 12 - 20      Est, Glom Filt Rate 82 >60 ml/min/1.73m2    Calcium 9.5 8.5 - 10.1 MG/DL    Total Bilirubin 0.5 0.2 - 1.0 MG/DL    ALT 16 12 - 78 U/L    AST 17 15 - 37 U/L    Alk Phosphatase 93 45 - 117 U/L    Total Protein 7.0 6.4 - 8.2 g/dL    Albumin 3.6 3.5 - 5.0 g/dL    Globulin 3.4 2.0 - 4.0 g/dL    Albumin/Globulin Ratio 1.1 1.1 - 2.2     Phosphorus    Collection Time: 25 11:05 AM   Result Value Ref Range    Phosphorus 4.4 2.6 - 4.7 MG/DL       Prolia 60 mg was administered subcutaneous in  left upper arm.       Ms. Smalls tolerated well, and had no complaints.  Patient armband removed and shredded    [facility] at 1305. She is to return on 2025 at 1400 for her next appointment.    Autumn Rodrigez RN  2025  1:21 PM

## 2025-02-07 ENCOUNTER — TELEPHONE (OUTPATIENT)
Age: 65
End: 2025-02-07

## 2025-02-07 NOTE — TELEPHONE ENCOUNTER
Called patient and told her she could test positive 1 month after covid she had covid 1/28/2025 and has no fever and symptoms I told her she was fine to go back out to work without a mask

## 2025-04-23 NOTE — PROGRESS NOTES
Assessment/ Plan:       ASSESSMENT AND PLAN  1. Acute cough  Lung sounds clear to auscultation  Chest xray ordered  Vital stable today  - XR CHEST (2 VW); Future    2. Anxiety  Refill provided on the xanax  Side effects with the buspar  - ALPRAZolam (XANAX) 0.25 MG tablet; Take 1 tablet by mouth nightly as needed for Sleep or Anxiety for up to 30 days. Max Daily Amount: 0.25 mg  Dispense: 20 tablet; Refill: 0          Return in about 6 months (around 11/1/2025) for medication follow up, labs, blood pressure check.       Subjective:  Heather Smalls is a 64 y.o. female here with c/o  Chief Complaint   Patient presents with    Cough     Dry cough since having Covid  Needs refill on xanax     Acute cough  Patient reports having COVID back in January 2025.  Ever since she reports a dry cough.  Denies fevers.  Denies productive sputum with the cough.    Denies chest pain.  Denies shortness of breath.  Feels well otherwise.  Denies changes in her activity level.  Just wanted to get checked out to make sure it was okay.    Of note, her son is getting  next month and this has been stressful.  She would like a refill on her Xanax.  She had side effects with BuSpar. she takes the Xanax as needed    Patient Active Problem List   Diagnosis    Closed compression fracture of lumbosacral spine (HCC)    Intractable back pain    Osteoporosis    Palpitations    Family history of colonic polyps       Past Medical History:   Diagnosis Date    Anxiety     Hypertension          Current Outpatient Medications:     ALPRAZolam (XANAX) 0.25 MG tablet, Take 1 tablet by mouth nightly as needed for Sleep or Anxiety for up to 30 days. Max Daily Amount: 0.25 mg, Disp: 20 tablet, Rfl: 0    escitalopram (LEXAPRO) 10 MG tablet, Take 1 tablet by mouth daily, Disp: 90 tablet, Rfl: 2    calcium carbonate (OSCAL) 500 MG TABS tablet, Take 1 tablet by mouth daily, Disp: , Rfl:     vitamin D 25 MCG (1000 UT) CAPS, Take 1 capsule by mouth

## 2025-05-01 ENCOUNTER — HOSPITAL ENCOUNTER (OUTPATIENT)
Facility: HOSPITAL | Age: 65
Discharge: HOME | End: 2025-05-01
Payer: MEDICARE

## 2025-05-01 ENCOUNTER — OFFICE VISIT (OUTPATIENT)
Age: 65
End: 2025-05-01
Payer: MEDICARE

## 2025-05-01 VITALS
HEART RATE: 66 BPM | OXYGEN SATURATION: 96 % | HEIGHT: 66 IN | SYSTOLIC BLOOD PRESSURE: 128 MMHG | DIASTOLIC BLOOD PRESSURE: 78 MMHG | RESPIRATION RATE: 18 BRPM | BODY MASS INDEX: 22.24 KG/M2 | TEMPERATURE: 98.7 F

## 2025-05-01 DIAGNOSIS — R05.1 ACUTE COUGH: Primary | ICD-10-CM

## 2025-05-01 DIAGNOSIS — F41.9 ANXIETY: ICD-10-CM

## 2025-05-01 DIAGNOSIS — R05.1 ACUTE COUGH: ICD-10-CM

## 2025-05-01 PROCEDURE — 1123F ACP DISCUSS/DSCN MKR DOCD: CPT | Performed by: NURSE PRACTITIONER

## 2025-05-01 PROCEDURE — 1159F MED LIST DOCD IN RCRD: CPT | Performed by: NURSE PRACTITIONER

## 2025-05-01 PROCEDURE — 1160F RVW MEDS BY RX/DR IN RCRD: CPT | Performed by: NURSE PRACTITIONER

## 2025-05-01 PROCEDURE — 99213 OFFICE O/P EST LOW 20 MIN: CPT | Performed by: NURSE PRACTITIONER

## 2025-05-01 PROCEDURE — 1126F AMNT PAIN NOTED NONE PRSNT: CPT | Performed by: NURSE PRACTITIONER

## 2025-05-01 PROCEDURE — 71046 X-RAY EXAM CHEST 2 VIEWS: CPT

## 2025-05-01 RX ORDER — ALPRAZOLAM 0.25 MG
0.25 TABLET ORAL NIGHTLY PRN
Qty: 20 TABLET | Refills: 0 | Status: SHIPPED | OUTPATIENT
Start: 2025-05-01 | End: 2025-05-31

## 2025-05-01 ASSESSMENT — PATIENT HEALTH QUESTIONNAIRE - PHQ9
SUM OF ALL RESPONSES TO PHQ QUESTIONS 1-9: 2
1. LITTLE INTEREST OR PLEASURE IN DOING THINGS: SEVERAL DAYS
SUM OF ALL RESPONSES TO PHQ QUESTIONS 1-9: 2
2. FEELING DOWN, DEPRESSED OR HOPELESS: SEVERAL DAYS

## 2025-05-01 NOTE — PROGRESS NOTES
\"Have you been to the ER, urgent care clinic since your last visit?  Hospitalized since your last visit?\"    NO    “Have you seen or consulted any other health care providers outside of [facility] since your last visit?”    NO            Click Here for Release of Records Request

## 2025-05-02 ENCOUNTER — RESULTS FOLLOW-UP (OUTPATIENT)
Age: 65
End: 2025-05-02

## 2025-05-02 ASSESSMENT — ENCOUNTER SYMPTOMS
COUGH: 1
EYE DISCHARGE: 0
ABDOMINAL DISTENTION: 0
EYE ITCHING: 0

## 2025-05-20 NOTE — PROGRESS NOTES
ASSESSMENT and PLAN  1. Palpitations  Occasional isolated PVCs with otherwise no significant arrhythmias on 7-day monitor 4/2023.  LV function normal on echo 4/21/2023 and calcium score 0 on cardiac CT 4/20/2023.  Rare brief symptoms.  No indication for further testing or escalation of therapy.       Follow-up as needed.  I instructed her to contact our office if her symptoms escalate.  The patient has been instructed and agrees to call our office with any issues or other concerns related to their cardiac condition(s) and/or complaint(s).      CHIEF COMPLAINT  Routine follow-up    HPI:    Heather Smalls is a 64 y.o. female here for follow-up of palpitations.  No cardiac issues have developed since the last visit on 5/28/2024.  She has very brief palpitations rarely typically lasting only a second or so and most often associated with anxiety or increase stress.  She has not experienced any sustained palpitations or heart racing.  She denies chest pain, dyspnea, syncope or stroke/TIA symptoms.    PERTINENT CARDIAC HISTORY: (Records reviewed and summarized below)  Palpitations  ==> Echo 4/21/2023, EF 60%, 1+ MR, RV nl, PASP 27  ==> 7-day monitor 4/2023, HR  bpm, avg 72 bpm, 1% PVCs, 1 SVT run <10 seconds.  ==> Cardiac CT 4/20/2023, calcium score 0    Family history of heart disease  Anxiety    Past medical history, past surgical history, family history, social history, and medications were all reviewed with the patient today and updated as necessary.     Current Outpatient Medications   Medication Sig    escitalopram (LEXAPRO) 10 MG tablet Take 1 tablet by mouth daily    calcium carbonate (OSCAL) 500 MG TABS tablet Take 1 tablet by mouth daily    vitamin D 25 MCG (1000 UT) CAPS Take 1 capsule by mouth daily    cyanocobalamin 100 MCG tablet Vitamin B12    denosumab (PROLIA) 60 MG/ML SOSY SC injection Inject 1 mL into the skin     No current facility-administered medications for this visit.     Allergies

## 2025-06-02 ENCOUNTER — OFFICE VISIT (OUTPATIENT)
Age: 65
End: 2025-06-02
Payer: MEDICARE

## 2025-06-02 VITALS
BODY MASS INDEX: 21.38 KG/M2 | WEIGHT: 133 LBS | DIASTOLIC BLOOD PRESSURE: 78 MMHG | OXYGEN SATURATION: 95 % | SYSTOLIC BLOOD PRESSURE: 130 MMHG | HEART RATE: 66 BPM | TEMPERATURE: 97.9 F | HEIGHT: 66 IN

## 2025-06-02 DIAGNOSIS — R00.2 PALPITATIONS: Primary | ICD-10-CM

## 2025-06-02 PROCEDURE — 99213 OFFICE O/P EST LOW 20 MIN: CPT | Performed by: INTERNAL MEDICINE

## 2025-06-02 PROCEDURE — 1123F ACP DISCUSS/DSCN MKR DOCD: CPT | Performed by: INTERNAL MEDICINE

## 2025-06-02 PROCEDURE — 1126F AMNT PAIN NOTED NONE PRSNT: CPT | Performed by: INTERNAL MEDICINE

## 2025-06-02 ASSESSMENT — PATIENT HEALTH QUESTIONNAIRE - PHQ9
1. LITTLE INTEREST OR PLEASURE IN DOING THINGS: NOT AT ALL
2. FEELING DOWN, DEPRESSED OR HOPELESS: NOT AT ALL
SUM OF ALL RESPONSES TO PHQ QUESTIONS 1-9: 0

## 2025-06-02 NOTE — PROGRESS NOTES
Identified pt with two pt identifiers(name and ). Reviewed record in preparation for visit and have obtained necessary documentation.  Chief Complaint   Patient presents with    Other     PVC      /78 (BP Site: Left Upper Arm, Patient Position: Sitting, BP Cuff Size: Medium Adult)   Pulse 66   Temp 97.9 °F (36.6 °C) (Temporal)   Ht 1.676 m (5' 6\")   Wt 60.3 kg (133 lb)   SpO2 95%   BMI 21.47 kg/m²       Medications reviewed/approved by provider.      Health Maintenance Review: Patient reminded of \"due or due soon\" health maintenance. I have asked the patient to contact his/her primary care provider (PCP) for follow-up on his/her health maintenance.    Coordination of Care Questionnaire:  :   1) Have you been to an emergency room, urgent care, or hospitalized since your last visit?  If yes, where when, and reason for visit? no       2. Have seen or consulted any other health care provider since your last visit?   If yes, where when, and reason for visit?  no      Patient is accompanied by self I have received verbal consent from Heather Smalls to discuss any/all medical information while they are present in the room.

## 2025-07-01 ENCOUNTER — TELEPHONE (OUTPATIENT)
Age: 65
End: 2025-07-01

## 2025-07-01 DIAGNOSIS — E78.00 ELEVATED LDL CHOLESTEROL LEVEL: ICD-10-CM

## 2025-07-01 DIAGNOSIS — Z79.899 HIGH RISK MEDICATION USE: ICD-10-CM

## 2025-07-01 DIAGNOSIS — M81.0 OSTEOPOROSIS, UNSPECIFIED OSTEOPOROSIS TYPE, UNSPECIFIED PATHOLOGICAL FRACTURE PRESENCE: Primary | ICD-10-CM

## 2025-07-01 DIAGNOSIS — R00.2 PALPITATIONS: ICD-10-CM

## 2025-07-07 ENCOUNTER — LAB (OUTPATIENT)
Age: 65
End: 2025-07-07
Payer: MEDICARE

## 2025-07-07 DIAGNOSIS — E78.00 ELEVATED LDL CHOLESTEROL LEVEL: ICD-10-CM

## 2025-07-07 DIAGNOSIS — Z79.899 HIGH RISK MEDICATION USE: ICD-10-CM

## 2025-07-07 DIAGNOSIS — M81.0 OSTEOPOROSIS, UNSPECIFIED OSTEOPOROSIS TYPE, UNSPECIFIED PATHOLOGICAL FRACTURE PRESENCE: Primary | ICD-10-CM

## 2025-07-07 DIAGNOSIS — R00.2 PALPITATIONS: ICD-10-CM

## 2025-07-07 PROCEDURE — 36415 COLL VENOUS BLD VENIPUNCTURE: CPT | Performed by: NURSE PRACTITIONER

## 2025-07-08 DIAGNOSIS — M81.0 OSTEOPOROSIS, UNSPECIFIED OSTEOPOROSIS TYPE, UNSPECIFIED PATHOLOGICAL FRACTURE PRESENCE: Primary | ICD-10-CM

## 2025-07-08 LAB
ALBUMIN SERPL-MCNC: 3.9 G/DL (ref 3.5–5)
ALBUMIN/GLOB SERPL: 1.4 (ref 1.1–2.2)
ALP SERPL-CCNC: 66 U/L (ref 45–117)
ALT SERPL-CCNC: 21 U/L (ref 12–78)
ANION GAP SERPL CALC-SCNC: 3 MMOL/L (ref 2–12)
AST SERPL-CCNC: 22 U/L (ref 15–37)
BASOPHILS # BLD: 0.02 K/UL (ref 0–0.1)
BASOPHILS NFR BLD: 0.5 % (ref 0–1)
BILIRUB SERPL-MCNC: 0.6 MG/DL (ref 0.2–1)
BUN SERPL-MCNC: 22 MG/DL (ref 6–20)
BUN/CREAT SERPL: 30 (ref 12–20)
CALCIUM SERPL-MCNC: 9.5 MG/DL (ref 8.5–10.1)
CHLORIDE SERPL-SCNC: 105 MMOL/L (ref 97–108)
CHOLEST SERPL-MCNC: 208 MG/DL
CO2 SERPL-SCNC: 30 MMOL/L (ref 21–32)
CREAT SERPL-MCNC: 0.74 MG/DL (ref 0.55–1.02)
DIFFERENTIAL METHOD BLD: NORMAL
EOSINOPHIL # BLD: 0.09 K/UL (ref 0–0.4)
EOSINOPHIL NFR BLD: 2.3 % (ref 0–7)
ERYTHROCYTE [DISTWIDTH] IN BLOOD BY AUTOMATED COUNT: 12.8 % (ref 11.5–14.5)
GLOBULIN SER CALC-MCNC: 2.8 G/DL (ref 2–4)
GLUCOSE SERPL-MCNC: 88 MG/DL (ref 65–100)
HCT VFR BLD AUTO: 47 % (ref 35–47)
HDLC SERPL-MCNC: 70 MG/DL
HDLC SERPL: 3 (ref 0–5)
HGB BLD-MCNC: 14.7 G/DL (ref 11.5–16)
IMM GRANULOCYTES # BLD AUTO: 0 K/UL (ref 0–0.04)
IMM GRANULOCYTES NFR BLD AUTO: 0 % (ref 0–0.5)
LDLC SERPL CALC-MCNC: 121.6 MG/DL (ref 0–100)
LYMPHOCYTES # BLD: 1.37 K/UL (ref 0.8–3.5)
LYMPHOCYTES NFR BLD: 35.7 % (ref 12–49)
MAGNESIUM SERPL-MCNC: 2.2 MG/DL (ref 1.6–2.4)
MCH RBC QN AUTO: 30.4 PG (ref 26–34)
MCHC RBC AUTO-ENTMCNC: 31.3 G/DL (ref 30–36.5)
MCV RBC AUTO: 97.3 FL (ref 80–99)
MONOCYTES # BLD: 0.31 K/UL (ref 0–1)
MONOCYTES NFR BLD: 8.1 % (ref 5–13)
NEUTS SEG # BLD: 2.05 K/UL (ref 1.8–8)
NEUTS SEG NFR BLD: 53.4 % (ref 32–75)
NRBC # BLD: 0 K/UL (ref 0–0.01)
NRBC BLD-RTO: 0 PER 100 WBC
PHOSPHATE SERPL-MCNC: 3.7 MG/DL (ref 2.6–4.7)
PLATELET # BLD AUTO: 163 K/UL (ref 150–400)
PMV BLD AUTO: 12.8 FL (ref 8.9–12.9)
POTASSIUM SERPL-SCNC: 5 MMOL/L (ref 3.5–5.1)
PROT SERPL-MCNC: 6.7 G/DL (ref 6.4–8.2)
RBC # BLD AUTO: 4.83 M/UL (ref 3.8–5.2)
SODIUM SERPL-SCNC: 138 MMOL/L (ref 136–145)
TRIGL SERPL-MCNC: 82 MG/DL
TSH SERPL DL<=0.05 MIU/L-ACNC: 1.88 UIU/ML (ref 0.36–3.74)
VLDLC SERPL CALC-MCNC: 16.4 MG/DL
WBC # BLD AUTO: 3.8 K/UL (ref 3.6–11)

## 2025-07-08 RX ORDER — ACETAMINOPHEN 325 MG/1
650 TABLET ORAL
Status: CANCELLED | OUTPATIENT
Start: 2025-07-08

## 2025-07-08 RX ORDER — SODIUM CHLORIDE 9 MG/ML
INJECTION, SOLUTION INTRAVENOUS PRN
Status: CANCELLED | OUTPATIENT
Start: 2025-07-08

## 2025-07-08 RX ORDER — EPINEPHRINE 1 MG/ML
0.3 INJECTION, SOLUTION, CONCENTRATE INTRAVENOUS PRN
Status: CANCELLED | OUTPATIENT
Start: 2025-07-08

## 2025-07-08 RX ORDER — DIPHENHYDRAMINE HYDROCHLORIDE 50 MG/ML
50 INJECTION, SOLUTION INTRAMUSCULAR; INTRAVENOUS
Status: CANCELLED | OUTPATIENT
Start: 2025-07-08

## 2025-07-08 RX ORDER — HYDROCORTISONE SODIUM SUCCINATE 100 MG/2ML
100 INJECTION INTRAMUSCULAR; INTRAVENOUS
Status: CANCELLED | OUTPATIENT
Start: 2025-07-08

## 2025-07-08 RX ORDER — ALBUTEROL SULFATE 90 UG/1
4 INHALANT RESPIRATORY (INHALATION) PRN
Status: CANCELLED | OUTPATIENT
Start: 2025-07-08

## 2025-07-08 RX ORDER — ONDANSETRON 2 MG/ML
8 INJECTION INTRAMUSCULAR; INTRAVENOUS
Status: CANCELLED | OUTPATIENT
Start: 2025-07-08

## 2025-07-08 RX ORDER — FAMOTIDINE 10 MG/ML
20 INJECTION, SOLUTION INTRAVENOUS
Status: CANCELLED | OUTPATIENT
Start: 2025-07-08

## 2025-07-09 ENCOUNTER — HOSPITAL ENCOUNTER (OUTPATIENT)
Facility: HOSPITAL | Age: 65
Setting detail: INFUSION SERIES
Discharge: HOME | End: 2025-07-09
Payer: MEDICARE

## 2025-07-09 VITALS
WEIGHT: 135 LBS | TEMPERATURE: 97.9 F | BODY MASS INDEX: 21.79 KG/M2 | DIASTOLIC BLOOD PRESSURE: 57 MMHG | HEART RATE: 76 BPM | SYSTOLIC BLOOD PRESSURE: 122 MMHG | OXYGEN SATURATION: 97 % | RESPIRATION RATE: 16 BRPM

## 2025-07-09 DIAGNOSIS — M81.0 OSTEOPOROSIS, UNSPECIFIED OSTEOPOROSIS TYPE, UNSPECIFIED PATHOLOGICAL FRACTURE PRESENCE: Primary | ICD-10-CM

## 2025-07-09 PROCEDURE — 6360000002 HC RX W HCPCS: Performed by: NURSE PRACTITIONER

## 2025-07-09 PROCEDURE — 96372 THER/PROPH/DIAG INJ SC/IM: CPT

## 2025-07-09 RX ORDER — DIPHENHYDRAMINE HYDROCHLORIDE 50 MG/ML
50 INJECTION, SOLUTION INTRAMUSCULAR; INTRAVENOUS
OUTPATIENT
Start: 2026-01-04

## 2025-07-09 RX ORDER — EPINEPHRINE 1 MG/ML
0.3 INJECTION, SOLUTION, CONCENTRATE INTRAVENOUS PRN
Status: DISCONTINUED | OUTPATIENT
Start: 2025-07-09 | End: 2025-07-10 | Stop reason: HOSPADM

## 2025-07-09 RX ORDER — SODIUM CHLORIDE 9 MG/ML
INJECTION, SOLUTION INTRAVENOUS PRN
Status: DISCONTINUED | OUTPATIENT
Start: 2025-07-09 | End: 2025-07-10 | Stop reason: HOSPADM

## 2025-07-09 RX ORDER — ACETAMINOPHEN 325 MG/1
650 TABLET ORAL
Status: DISCONTINUED | OUTPATIENT
Start: 2025-07-09 | End: 2025-07-10 | Stop reason: HOSPADM

## 2025-07-09 RX ORDER — EPINEPHRINE 1 MG/ML
0.3 INJECTION, SOLUTION, CONCENTRATE INTRAVENOUS PRN
OUTPATIENT
Start: 2026-01-04

## 2025-07-09 RX ORDER — HYDROCORTISONE SODIUM SUCCINATE 100 MG/2ML
100 INJECTION INTRAMUSCULAR; INTRAVENOUS
Status: DISCONTINUED | OUTPATIENT
Start: 2025-07-09 | End: 2025-07-10 | Stop reason: HOSPADM

## 2025-07-09 RX ORDER — ONDANSETRON 2 MG/ML
8 INJECTION INTRAMUSCULAR; INTRAVENOUS
OUTPATIENT
Start: 2026-01-04

## 2025-07-09 RX ORDER — ALBUTEROL SULFATE 90 UG/1
4 INHALANT RESPIRATORY (INHALATION) PRN
OUTPATIENT
Start: 2026-01-04

## 2025-07-09 RX ORDER — ALPRAZOLAM 0.5 MG
0.5 TABLET ORAL NIGHTLY PRN
COMMUNITY

## 2025-07-09 RX ORDER — ACETAMINOPHEN 325 MG/1
650 TABLET ORAL
OUTPATIENT
Start: 2026-01-04

## 2025-07-09 RX ORDER — ALBUTEROL SULFATE 90 UG/1
4 INHALANT RESPIRATORY (INHALATION) PRN
Status: DISCONTINUED | OUTPATIENT
Start: 2025-07-09 | End: 2025-07-10 | Stop reason: HOSPADM

## 2025-07-09 RX ORDER — ONDANSETRON 2 MG/ML
8 INJECTION INTRAMUSCULAR; INTRAVENOUS
Status: DISCONTINUED | OUTPATIENT
Start: 2025-07-09 | End: 2025-07-10 | Stop reason: HOSPADM

## 2025-07-09 RX ORDER — HYDROCORTISONE SODIUM SUCCINATE 100 MG/2ML
100 INJECTION INTRAMUSCULAR; INTRAVENOUS
OUTPATIENT
Start: 2026-01-04

## 2025-07-09 RX ORDER — DIPHENHYDRAMINE HYDROCHLORIDE 50 MG/ML
50 INJECTION, SOLUTION INTRAMUSCULAR; INTRAVENOUS
Status: DISCONTINUED | OUTPATIENT
Start: 2025-07-09 | End: 2025-07-10 | Stop reason: HOSPADM

## 2025-07-09 RX ORDER — SODIUM CHLORIDE 9 MG/ML
INJECTION, SOLUTION INTRAVENOUS PRN
OUTPATIENT
Start: 2026-01-04

## 2025-07-09 RX ADMIN — DENOSUMAB 60 MG: 60 INJECTION SUBCUTANEOUS at 14:16

## 2025-07-09 NOTE — DISCHARGE INSTRUCTIONS
denosumab (Prolia)  Pronunciation:  lien OH avis mab  Brand:  Prolia  What is the most important information I should know about Prolia?  This medication guide provides information about the Prolia brand of denosumab. Xgeva is another brand of denosumab used to prevent bone fractures and other skeletal conditions in people with tumors that have spread to the bone.  Prolia can cause many serious side effects. Call your doctor at once if you have a fever, chills, pain or burning when you urinate, severe stomach pain, cough, shortness of breath, skin problems, numbness or tingling, severe or unusual pain, or skin problems.  Do not use if you are pregnant. Use effective birth control while using Prolia, and for at least 5 months after you stop.  What is denosumab (Prolia)?  Denosumab is a monoclonal antibody. Monoclonal antibodies are made to target and destroy only certain cells in the body. This may help to protect healthy cells from damage.  The Prolia brand of denosumab is used to treat osteoporosis or bone loss in men and women who have a high risk of bone fracture. Prolia is sometimes used in people whose bone fracture is caused by certain medicines or cancer treatments.  This medication guide provides information about the Prolia brand of denosumab. Xgeva is another brand of denosumab used to prevent bone fractures and other skeletal conditions in people with tumors that have spread to the bone.  Denosumab may also be used for purposes not listed in this medication guide.  What should I discuss with my healthcare provider before receiving Prolia?  You should not receive Prolia if you are allergic to denosumab, or if you have:  low levels of calcium in your blood (hypocalcemia); or  if you are pregnant.  While you are using Prolia, you should not receive Xgeva, another brand of denosumab.  Tell your doctor if you have ever had:  kidney disease (or if you are on dialysis);  a weak immune system (caused by  does not endorse drugs, diagnose patients or recommend therapy. [facility]'s drug information is an informational resource designed to assist licensed healthcare practitioners in caring for their patients and/or to serve consumers viewing this service as a supplement to, and not a substitute for, the expertise, skill, knowledge and judgment of healthcare practitioners. The absence of a warning for a given drug or drug combination in no way should be construed to indicate that the drug or drug combination is safe, effective or appropriate for any given patient. [facility] does not assume any responsibility for any aspect of healthcare administered with the aid of information [facility] provides. The information contained herein is not intended to cover all possible uses, directions, precautions, warnings, drug interactions, allergic reactions, or adverse effects. If you have questions about the drugs you are taking, check with your doctor, nurse or pharmacist.  Copyright 9680-7124 [facility] [employer]. Version: 12.01. Revision date: 2/18/2020.  Care instructions adapted under license by [employer]. If you have questions about a medical condition or this instruction, always ask your healthcare professional. [employer], [employer] disclaims any warranty or liability for your use of this information.

## 2025-07-31 ENCOUNTER — OFFICE VISIT (OUTPATIENT)
Age: 65
End: 2025-07-31
Payer: MEDICARE

## 2025-07-31 VITALS
BODY MASS INDEX: 22.99 KG/M2 | RESPIRATION RATE: 18 BRPM | HEART RATE: 58 BPM | SYSTOLIC BLOOD PRESSURE: 124 MMHG | DIASTOLIC BLOOD PRESSURE: 70 MMHG | TEMPERATURE: 97.6 F | HEIGHT: 65 IN | OXYGEN SATURATION: 100 % | WEIGHT: 138 LBS

## 2025-07-31 DIAGNOSIS — Z23 ENCOUNTER FOR ADMINISTRATION OF VACCINE: ICD-10-CM

## 2025-07-31 DIAGNOSIS — F41.9 ANXIETY: ICD-10-CM

## 2025-07-31 DIAGNOSIS — M81.0 OSTEOPOROSIS, UNSPECIFIED OSTEOPOROSIS TYPE, UNSPECIFIED PATHOLOGICAL FRACTURE PRESENCE: ICD-10-CM

## 2025-07-31 DIAGNOSIS — Z11.59 NEED FOR HEPATITIS C SCREENING TEST: ICD-10-CM

## 2025-07-31 DIAGNOSIS — Z00.00 INITIAL MEDICARE ANNUAL WELLNESS VISIT: Primary | ICD-10-CM

## 2025-07-31 PROCEDURE — 1159F MED LIST DOCD IN RCRD: CPT | Performed by: NURSE PRACTITIONER

## 2025-07-31 PROCEDURE — 36415 COLL VENOUS BLD VENIPUNCTURE: CPT | Performed by: NURSE PRACTITIONER

## 2025-07-31 PROCEDURE — 1126F AMNT PAIN NOTED NONE PRSNT: CPT | Performed by: NURSE PRACTITIONER

## 2025-07-31 PROCEDURE — G0438 PPPS, INITIAL VISIT: HCPCS | Performed by: NURSE PRACTITIONER

## 2025-07-31 PROCEDURE — 1123F ACP DISCUSS/DSCN MKR DOCD: CPT | Performed by: NURSE PRACTITIONER

## 2025-07-31 PROCEDURE — 1160F RVW MEDS BY RX/DR IN RCRD: CPT | Performed by: NURSE PRACTITIONER

## 2025-07-31 ASSESSMENT — PATIENT HEALTH QUESTIONNAIRE - PHQ9
SUM OF ALL RESPONSES TO PHQ QUESTIONS 1-9: 1
SUM OF ALL RESPONSES TO PHQ QUESTIONS 1-9: 1
1. LITTLE INTEREST OR PLEASURE IN DOING THINGS: NOT AT ALL
SUM OF ALL RESPONSES TO PHQ QUESTIONS 1-9: 1
SUM OF ALL RESPONSES TO PHQ QUESTIONS 1-9: 1
2. FEELING DOWN, DEPRESSED OR HOPELESS: SEVERAL DAYS

## 2025-07-31 ASSESSMENT — ENCOUNTER SYMPTOMS
GASTROINTESTINAL NEGATIVE: 1
EYES NEGATIVE: 1
RESPIRATORY NEGATIVE: 1

## 2025-07-31 ASSESSMENT — LIFESTYLE VARIABLES
HOW OFTEN DO YOU HAVE A DRINK CONTAINING ALCOHOL: 2-3 TIMES A WEEK
HOW MANY STANDARD DRINKS CONTAINING ALCOHOL DO YOU HAVE ON A TYPICAL DAY: 1 OR 2

## 2025-07-31 NOTE — PROGRESS NOTES
Have you been to the ER, urgent care clinic since your last visit?  Hospitalized since your last visit?   NO    Have you seen or consulted any other health care providers outside our system since your last visit?   YES - When: approximately 3  weeks ago.  Where and Why: infusion.           
Flowsheets. The following problems were reviewed today and where indicated follow up appointments were made and/or referrals ordered.    No Positive Risk Factors identified today.                                     Objective   Vitals:    07/31/25 0855 07/31/25 0857   BP:  124/70   Pulse:  58   Resp: 18 18   Temp: 97.6 °F (36.4 °C) 97.6 °F (36.4 °C)   TempSrc: Temporal Temporal   SpO2:  100%   Weight: 62.6 kg (138 lb) 62.6 kg (138 lb)   Height:  1.662 m (5' 5.43\")      Body mass index is 22.66 kg/m².                    Allergies   Allergen Reactions    Sulfa Antibiotics Rash     Prior to Visit Medications    Medication Sig Taking? Authorizing Provider   ALPRAZolam (XANAX) 0.5 MG tablet Take 1 tablet by mouth nightly as needed for Sleep. Yes Sarai Pike MD   escitalopram (LEXAPRO) 10 MG tablet Take 1 tablet by mouth daily Yes Radha Gomez, APRN - NP   calcium carbonate (OSCAL) 500 MG TABS tablet Take 1 tablet by mouth daily Yes Sarai Pike MD   vitamin D 25 MCG (1000 UT) CAPS Take 1 capsule by mouth daily Yes Automatic Reconciliation, Ar   cyanocobalamin 100 MCG tablet Vitamin B12 Yes Automatic Reconciliation, Ar   denosumab (PROLIA) 60 MG/ML SOSY SC injection Inject 1 mL into the skin Yes Automatic Reconciliation, Ar       CareTeam (Including outside providers/suppliers regularly involved in providing care):   Patient Care Team:  Radha Gomez APRN - NP as PCP - General (Family Medicine)  Radha Gomez APRN - NP as PCP - Empaneled Provider     Recommendations for Preventive Services Due: see orders and patient instructions/AVS.  Recommended screening schedule for the next 5-10 years is provided to the patient in written form: see Patient Instructions/AVS.     Reviewed and updated this visit:  Tobacco  Allergies  Meds  Problems  Med Hx  Surg Hx  Fam Hx

## (undated) DEVICE — GOWN,ISO,KNITCUFF, PREMIUM BLUE, LV3,XL: Brand: MEDLINE INDUSTRIES, INC.

## (undated) DEVICE — SYRINGE 20ML LL S/C 50

## (undated) DEVICE — SOLUTION IRRIG 1000ML STRL H2O USP PLAS POUR BTL

## (undated) DEVICE — LINER,SEMI-RIGID,3000CC,50EA/CS: Brand: MEDLINE

## (undated) DEVICE — KIT ENDO OP4 CA 1.1+ BX20

## (undated) DEVICE — BLUNT CANNULA: Brand: MONOJECT